# Patient Record
Sex: FEMALE | Employment: UNEMPLOYED | URBAN - METROPOLITAN AREA
[De-identification: names, ages, dates, MRNs, and addresses within clinical notes are randomized per-mention and may not be internally consistent; named-entity substitution may affect disease eponyms.]

---

## 2018-12-13 ENCOUNTER — OFFICE VISIT (OUTPATIENT)
Dept: PRIMARY CARE CLINIC | Age: 23
End: 2018-12-13

## 2018-12-13 VITALS
TEMPERATURE: 98.2 F | SYSTOLIC BLOOD PRESSURE: 105 MMHG | DIASTOLIC BLOOD PRESSURE: 73 MMHG | OXYGEN SATURATION: 100 % | WEIGHT: 111.8 LBS | HEART RATE: 97 BPM | RESPIRATION RATE: 16 BRPM | BODY MASS INDEX: 20.57 KG/M2 | HEIGHT: 62 IN

## 2018-12-13 DIAGNOSIS — R53.83 FATIGUE, UNSPECIFIED TYPE: Primary | ICD-10-CM

## 2018-12-13 DIAGNOSIS — R63.4 WEIGHT LOSS: ICD-10-CM

## 2018-12-13 DIAGNOSIS — R61 SWEATING PROFUSELY: ICD-10-CM

## 2018-12-13 PROBLEM — F90.9 ADHD: Status: ACTIVE | Noted: 2018-12-13

## 2018-12-13 RX ORDER — NORGESTIMATE AND ETHINYL ESTRADIOL 7DAYSX3 28
KIT ORAL
COMMUNITY
End: 2022-10-18

## 2018-12-13 RX ORDER — GABAPENTIN 100 MG/1
CAPSULE ORAL
Refills: 0 | COMMUNITY
Start: 2018-12-07 | End: 2020-03-17 | Stop reason: DRUGHIGH

## 2018-12-13 RX ORDER — DULOXETIN HYDROCHLORIDE 20 MG/1
CAPSULE, DELAYED RELEASE ORAL
Refills: 0 | COMMUNITY
Start: 2018-12-07 | End: 2020-03-17

## 2018-12-13 RX ORDER — DEXTROAMPHETAMINE SACCHARATE, AMPHETAMINE ASPARTATE, DEXTROAMPHETAMINE SULFATE AND AMPHETAMINE SULFATE 7.5; 7.5; 7.5; 7.5 MG/1; MG/1; MG/1; MG/1
TABLET ORAL
Refills: 0 | COMMUNITY
Start: 2018-12-06

## 2018-12-13 NOTE — PROGRESS NOTES
Chief Complaint   Patient presents with   STEPHANE [facility] Other     has had N/V/D, dizziness, hot flashes, night sweats, joint pain x4 weeks. Psyciatrist wants her to get tested for lyme disease, no tickbite that she knows of.     Neck Pain     back of neck, no injury     1. Have you been to the ER, urgent care clinic since your last visit? Hospitalized since your last visit? No    2. Have you seen or consulted any other health care providers outside of the [address] [place] [address] since your last visit? Include any pap smears or colon screening. No      Pt would like a pregnancy test but has not missed her period.

## 2018-12-13 NOTE — PROGRESS NOTES
Subjective:     Chief Complaint   Patient presents with   STEPHANE [facility] Other     has had N/V/D, dizziness, hot flashes, night sweats, joint pain x4 weeks. Psyciatrist wants her to get tested for lyme disease, no tickbite that she knows of.     Neck Pain     back of neck, no injury        She  is a 24y.o. year old female who presents as a new patient to [facility]. She recently graduated from [employer] and then moved to [place]. She is currently working as a EMT. . Her medical history is significant for ADHD, anxiety. Has started seeing psychiatrist in [facility]. Patient reports that when she answered some intake questionnaire at her psych office, her psychiatrist was concern about some of the symptoms she  put in the questionnaire. Reports that for the past 4-5 years she has been feeling very tired, dizzy, hot flashes, night sweats. Reports that at night she has been sweating profusely. She had seen her PCP, neurologist for the above symptoms but nothing significant was found. She had negative mono test, STI check up , normal thyroid and all other necessary blood work done and no abnormality was found. Patient states that she never tested for lyme D/S and would like to have this test done. Recently she  feeling more fatigue, weight loss about 15 pounds in last few months but slowly started gaining weight now. She did have some diarrhea two weeks ago but almost resolved now. States that her appetite is very good, no fever/chills, cough. Patient also reports that her body hurts, feels sore in her neck and back . She thinks its because she has been sleeping in sofa lately that's why. No headache, neck stiffness. A comprehensive review of systems was negative except for that written in the HPI.   Objective:     Vitals:    12/13/18 1207   BP: 105/73   Pulse: 97   Resp: 16   Temp: 98.2 °F (36.8 °C)   TempSrc: Oral   SpO2: 100%   Weight: 111 lb 12.8 oz (50.7 kg)   Height: 5' 1.75\" (1.568 m) Physical Examination: General appearance - alert, well appearing, and in no distress, oriented to person, place, and time and normal appearing weight  Mental status - alert, oriented to person, place, and time, normal mood, behavior, speech, dress, motor activity, and thought processes  Eyes - pupils equal and reactive, extraocular eye movements intact  Ears - bilateral TM's and external ear canals normal  Nose - normal and patent, no erythema, discharge or polyps  Mouth - mucous membranes moist, pharynx normal without lesions  Neck - supple, no significant adenopathy, thyroid exam: thyroid is normal in size without nodules or tenderness  Chest - clear to auscultation, no wheezes, rales or rhonchi, symmetric air entry  Heart - normal rate, regular rhythm, normal S1, S2, no murmurs, rubs, clicks or gallops  Abdomen - soft, nontender, nondistended, no masses or organomegaly  Back exam - full range of motion, no tenderness, palpable spasm or pain on motion  Neurological - alert, oriented, normal speech, no focal findings or movement disorder noted  Musculoskeletal - no joint tenderness, deformity or swelling  Extremities - no pedal edema noted    No Known Allergies   Social History     Socioeconomic History    Marital status: SINGLE     Spouse name: Not on file    Number of children: Not on file    Years of education: Not on file    Highest education level: Not on file   Tobacco Use    Smoking status: Never Smoker    Smokeless tobacco: Never Used   Substance and Sexual Activity    Alcohol use: Yes     Alcohol/week: 3.0 oz     Types: 5 Shots of liquor per week    Drug use: No      No family history on file. History reviewed. No pertinent surgical history.    Past Medical History:   Diagnosis Date    ADHD       Current Outpatient Medications   Medication Sig Dispense Refill    DULoxetine (CYMBALTA) 20 mg capsule take 2 capsules by mouth every morning  0    dextroamphetamine-amphetamine (ADDERALL) 30 mg tablet take 1 tablet by mouth twice a day  0    gabapentin (NEURONTIN) 100 mg capsule take 1 capsule by mouth twice a day  0    norgestimate-ethinyl estradiol (TRI-PREVIFEM, 28,) 0.18/0.215/0.25 mg-35 mcg (28) tab Take  by mouth. Assessment/ Plan:   Diagnoses and all orders for this visit:    1. Fatigue, unspecified type  -     CBC WITH AUTOMATED DIFF  -     TSH AND FREE T4  -     METABOLIC PANEL, COMPREHENSIVE  -     MONONUCLEOSIS SCREEN  -     LYME AB, IGG & IGM BY WB    2. Weight loss  -     CBC WITH AUTOMATED DIFF  -     TSH AND FREE T4  -     METABOLIC PANEL, COMPREHENSIVE  -     MONONUCLEOSIS SCREEN  -     LYME AB, IGG & IGM BY WB    3. Sweating profusely  -     CBC WITH AUTOMATED DIFF  -     TSH AND FREE T4  -     METABOLIC PANEL, COMPREHENSIVE  -     MONONUCLEOSIS SCREEN  -     LYME AB, IGG & IGM BY WB       Will notify patient with result. advised to follow up if symptoms does not better. Medication risks/benefits/costs/interactions/alternatives discussed with patient. Advised patient to call back or return to office if symptoms worsen/change/persist. If patient cannot reach us or should anything more severe/urgent arise he/she should proceed directly to the nearest emergency department. Discussed expected course/resolution/complications of diagnosis in detail with patient. Patient given a written after visit summary which includes her diagnoses, current medications and vitals. Patient expressed understanding with the diagnosis and plan. Follow-up Disposition:  Return if symptoms worsen or fail to improve.

## 2018-12-14 LAB
18 KD (IGG) BAND: ABNORMAL
23 KD (IGG) BAND: ABNORMAL
23 KD (IGM) BAND: ABNORMAL
28 KD (IGG) BAND: ABNORMAL
30 KD (IGG) BAND: ABNORMAL
39 KD (IGG) BAND: ABNORMAL
39 KD (IGM) BAND: ABNORMAL
41 KD (IGG) BAND: ABNORMAL
41 KD (IGM) BAND: ABNORMAL
45 KD (IGG) BAND: ABNORMAL
58 KD (IGG) BAND: ABNORMAL
66 KD (IGG) BAND: REACTIVE
93 KD (IGG) BAND: ABNORMAL
ABSOLUTE BANDS, 67058: ABNORMAL
ABSOLUTE BLASTS: ABNORMAL
ABSOLUTE METAMYELOCYTES, 900360: ABNORMAL
ABSOLUTE MYELOCYTES: ABNORMAL
ABSOLUTE NRBC,ANRBC: ABNORMAL
ABSOLUTE PROMYELOCYTES: ABNORMAL
ALB/GLOBRATIO, 58C: 1.6 (CALC) (ref 1–2.5)
ALBUMIN SERPL-MCNC: 4.2 G/DL (ref 3.6–5.1)
ALP SERPL-CCNC: 45 U/L (ref 33–115)
ALT SERPL-CCNC: 11 U/L (ref 6–29)
AST SERPL W P-5'-P-CCNC: 14 U/L (ref 10–30)
BANDS,BANDS: ABNORMAL
BASOPHILS # BLD: 42 CELLS/UL (ref 0–200)
BASOPHILS NFR BLD: 0.8 %
BILIRUB SERPL-MCNC: 0.2 MG/DL (ref 0.2–1.2)
BLASTS,BLAST: ABNORMAL
BUN SERPL-MCNC: 11 MG/DL (ref 7–25)
BUN/CREATININE RATIO,BUCR: NORMAL (CALC) (ref 6–22)
CALCIUM SERPL-MCNC: 9.1 MG/DL (ref 8.6–10.2)
CHLORIDE SERPL-SCNC: 103 MMOL/L (ref 98–110)
CO2 SERPL-SCNC: 27 MMOL/L (ref 20–32)
COMMENT(S): ABNORMAL
CREAT SERPL-MCNC: 0.83 MG/DL (ref 0.5–1.1)
EOSINOPHIL # BLD: 138 CELLS/UL (ref 15–500)
EOSINOPHIL NFR BLD: 2.6 %
ERYTHROCYTE [DISTWIDTH] IN BLOOD BY AUTOMATED COUNT: 12 % (ref 11–15)
GLOBULIN,GLOB: 2.7 G/DL (CALC) (ref 1.9–3.7)
GLUCOSE SERPL-MCNC: 80 MG/DL (ref 65–99)
HCT VFR BLD AUTO: 38.7 % (ref 35–45)
HETEROPHILE, MONO SCREEN: NEGATIVE
HGB BLD-MCNC: 13 G/DL (ref 11.7–15.5)
LYMPHOCYTES # BLD: 2396 CELLS/UL (ref 850–3900)
LYMPHOCYTES NFR BLD: 45.2 %
Lab: NEGATIVE
Lab: NEGATIVE
MCH RBC QN AUTO: 31.3 PG (ref 27–33)
MCHC RBC AUTO-ENTMCNC: 33.6 G/DL (ref 32–36)
MCV RBC AUTO: 93 FL (ref 80–100)
METAMYELOCYTES,METAS: ABNORMAL
MONOCYTES # BLD: 737 CELLS/UL (ref 200–950)
MONOCYTES NFR BLD: 13.9 %
MYELOCYTES,MYELO: ABNORMAL
NEUTROPHILS # BLD AUTO: 1988 CELLS/UL (ref 1500–7800)
NEUTROPHILS # BLD: 37.5 %
NRBC: ABNORMAL
PLATELET # BLD AUTO: 403 THOUSAND/UL (ref 140–400)
PMV BLD AUTO: 10.1 FL (ref 7.5–12.5)
POTASSIUM SERPL-SCNC: 4.1 MMOL/L (ref 3.5–5.3)
PROMYELOCYTES,PRO: ABNORMAL
PROT SERPL-MCNC: 6.9 G/DL (ref 6.1–8.1)
RBC # BLD AUTO: 4.16 MILLION/UL (ref 3.8–5.1)
REACTIVE LYMPHS: ABNORMAL
SODIUM SERPL-SCNC: 137 MMOL/L (ref 135–146)
T4 FREE SERPL-MCNC: 1 NG/DL (ref 0.8–1.8)
TSH SERPL DL<=0.005 MIU/L-ACNC: 1.58 MIU/L
WBC # BLD AUTO: 5.3 THOUSAND/UL (ref 3.8–10.8)

## 2018-12-17 ENCOUNTER — TELEPHONE (OUTPATIENT)
Dept: PRIMARY CARE CLINIC | Age: 23
End: 2018-12-17

## 2018-12-17 NOTE — TELEPHONE ENCOUNTER
I called back. She was letting me know that she sometimes have numbness tingling in her back . She was asking if she has MS. Advised that its a huge diagnosis and I cannot diagnose her over phone. She said she is busy now . She thanked me and hung up the phone.

## 2019-03-21 ENCOUNTER — HOSPITAL ENCOUNTER (OUTPATIENT)
Dept: ULTRASOUND IMAGING | Age: 24
Discharge: HOME | End: 2019-03-21
Attending: INTERNAL MEDICINE
Payer: COMMERCIAL

## 2019-03-21 DIAGNOSIS — R11.2 NAUSEA AND VOMITING: ICD-10-CM

## 2019-03-21 DIAGNOSIS — K59.00 CONSTIPATION: ICD-10-CM

## 2019-03-21 DIAGNOSIS — K82.4 GALLBLADDER POLYP: ICD-10-CM

## 2019-03-21 PROCEDURE — 76700 US EXAM ABDOM COMPLETE: CPT

## 2020-02-26 ENCOUNTER — OFFICE VISIT (OUTPATIENT)
Dept: SURGERY | Age: 25
End: 2020-02-26

## 2020-02-26 VITALS
TEMPERATURE: 98.1 F | HEIGHT: 63 IN | DIASTOLIC BLOOD PRESSURE: 64 MMHG | HEART RATE: 99 BPM | WEIGHT: 120 LBS | BODY MASS INDEX: 21.26 KG/M2 | RESPIRATION RATE: 19 BRPM | OXYGEN SATURATION: 99 % | SYSTOLIC BLOOD PRESSURE: 105 MMHG

## 2020-02-26 DIAGNOSIS — K82.4 GALLBLADDER POLYP: Primary | ICD-10-CM

## 2020-02-26 PROBLEM — F43.10 PTSD (POST-TRAUMATIC STRESS DISORDER): Status: ACTIVE | Noted: 2020-02-26

## 2020-02-26 RX ORDER — TRAZODONE HYDROCHLORIDE 50 MG/1
TABLET ORAL
COMMUNITY
Start: 2020-02-03

## 2020-02-26 RX ORDER — DESVENLAFAXINE SUCCINATE 50 MG/1
100 TABLET, EXTENDED RELEASE ORAL
COMMUNITY
End: 2020-03-17 | Stop reason: DRUGHIGH

## 2020-02-26 NOTE — PROGRESS NOTES
1. Have you been to the ER, urgent care clinic since your last visit? Hospitalized since your last visit? No    2. Have you seen or consulted any other health care providers outside of the [address] [place] [address] since your last visit? Include any pap smears or colon screening.  No

## 2020-02-26 NOTE — H&P (VIEW-ONLY)
Surgery History and Physical 
 
Subjective: Marisol Sharp is a 21 y. o.  female who presents for evaluation of gallbladder polyps. A couple of years ago, Ms. Madelaine Mcnally was diagnosed with gallstones during a w/u for abdominal pain and diarrhea. Last year, she was seen by GI and had a repeat US which revealed the largest polyp of 0.94 cm, which had increased. She still has postprandial n/v with greasy foods on occasion. She denies any h/o PUD, pancreatitis, liver disease, IBD, or IBS. She has not had any abdominal procedures. She was referred to have a surgical consultation last year, but did not receive it. Past Medical History:  
Diagnosis Date  ADHD  PTSD (post-traumatic stress disorder) History reviewed. No pertinent surgical history. Family History Problem Relation Age of Onset  Cancer Father   
     thyroid  Diabetes Maternal Grandmother  Hypertension Maternal Grandmother  Diabetes Maternal Grandfather  Hypertension Maternal Grandfather Social History Tobacco Use  Smoking status: Never Smoker  Smokeless tobacco: Never Used Substance Use Topics  Alcohol use: Yes Alcohol/week: 5.0 standard drinks Types: 5 Shots of liquor per week Prior to Admission medications Medication Sig Start Date End Date Taking? Authorizing Provider  
desvenlafaxine succinate (PRISTIQ) 50 mg ER tablet Take 50 mg by mouth. Yes Provider, Historical  
traZODone (DESYREL) 50 mg tablet TAKE 1 TABLET BY MOUTH AT BEDTIME 2/3/20  Yes Provider, Historical  
dextroamphetamine-amphetamine (ADDERALL) 30 mg tablet take 1 tablet by mouth twice a day 12/6/18  Yes Provider, Historical  
gabapentin (NEURONTIN) 100 mg capsule take 1 capsule by mouth twice a day 12/7/18  Yes Provider, Historical  
norgestimate-ethinyl estradiol (TRI-PREVIFEM, 28,) 0.18/0.215/0.25 mg-35 mcg (28) tab Take  by mouth.    Yes Provider, Historical  
 DULoxetine (CYMBALTA) 20 mg capsule take 2 capsules by mouth every morning 12/7/18   Provider, Historical  
  
No Known Allergies Review of Systems: A comprehensive review of systems was negative except for that written in the History of Present Illness. Objective:  
 
 Physical Exam: 
GENERAL: alert, cooperative, no distress, appears stated age, EYE: negative findings: anicteric sclera, LYMPHATIC: Cervical, supraclavicular, and axillary nodes normal. , THROAT & NECK: normal, LUNG: clear to auscultation bilaterally, HEART: regular rate and rhythm, ABDOMEN: Soft, NT, ND. There are no masses. , EXTREMITIES:  no edema, SKIN: Normal., NEUROLOGIC: negative, PSYCHIATRIC: non focal 
 
Assessment:  
 
Gallbladder polyps. Plan: Ms. Vini Fitzgerald is interested in having her GB removed to eliminate the future risk of cancer and has requested 3/19 after her job interview in [place]. I discussed the risks of the procedure including bleeding, infection, wound healing problems, blood clots, cancer, injury to the bowel, liver, or bile ducts, and reaction to the prep or local and general anesthetic. She understands the risks; any and all questions were answered to her satisfaction. Ms. Vini Fitzgerald will be scheduled for an elective outpatient robotic-assisted laparoscopic cholecystectomy with firefly, possible open under general anesthesia.

## 2020-02-27 ENCOUNTER — TELEPHONE (OUTPATIENT)
Dept: SURGERY | Age: 25
End: 2020-02-27

## 2020-02-27 NOTE — TELEPHONE ENCOUNTER
Attempted to call patient with surgery information, voicemail states \" voicemail is full\". Will attempt at later time.

## 2020-03-17 RX ORDER — DESVENLAFAXINE 100 MG/1
100 TABLET, EXTENDED RELEASE ORAL ONCE
COMMUNITY

## 2020-03-17 RX ORDER — GABAPENTIN 800 MG/1
800 TABLET ORAL 2 TIMES DAILY
COMMUNITY

## 2020-03-17 NOTE — PERIOP NOTES
[address] [address] [facility]   MAIN OR                                  [phone]   MAIN PRE OP                          [phone]                                                                                AMBULATORY PRE OP          [phone]  PRE-ADMISSION TESTING    [phone]   Surgery Date:   3/19/2020         Is surgery arrival time given by surgeon? NO  If NO, [facility] staff will call you between 3 and 7pm the day before your surgery with your arrival time. (If your surgery is on a Monday, we will call you the Friday before.)    Call [phone] after 7pm Monday-Friday if you did not receive this call. INSTRUCTIONS BEFORE YOUR SURGERY   When You  Arrive Arrive at the 2nd [address] [facility] on the day of your surgery  Have your insurance card, photo ID, and any copayment (if needed)   Food   and   Drink NO food or drink after midnight the night before surgery    This means NO water, gum, mints, coffee, juice, etc.  No alcohol (beer, wine, liquor) 24 hours before and after surgery   Medications to   TAKE   Morning of Surgery MEDICATIONS TO TAKE THE MORNING OF SURGERY WITH A SIP OF WATER:    gapapentin    Pristig   Tri-previfem   Medications  To  STOP      7 days before surgery  Non-Steroidal anti-inflammatory Drugs (NSAID's): for example, Ibuprofen (Advil, Motrin), Naproxen (Aleve)   Aspirin, if taking for pain    Herbal supplements, vitamins, and fish oil   Other:  (Pain medications not listed above, including Tylenol may be taken)   Blood  Thinners  If you take  Aspirin, Plavix, Coumadin, or any blood-thinning or anti-blood clot medicine, talk to the doctor who prescribed the medications for pre-operative instructions.    Bathing Clothing  Jewelry  Valuables      If you shower the morning of surgery, please do not apply anything to your skin (lotions, powders, deodorant, or makeup, especially lawson)   Follow Chlorhexidine Care Fusion body wash instructions provided to you during PAT appointment. Begin 3 days prior to surgery.  Do not shave or trim anywhere 24 hours before surgery   Wear your hair loose or down; no pony-tails, buns, or metal hair clips   Wear loose, comfortable, clean clothes   Wear glasses instead of contacts   Leave money, valuables, and jewelry, including body piercings, at home   Going Home - or Spending the Night  SAME-DAY SURGERY: You must have a responsible adult drive you home and stay with you 24 hours after surgery   ADMITS: If your doctor is keeping you in the hospital after surgery, leave personal belongings/luggage in your car until you have a hospital room number. Hospital discharge time is 12 noon  Drivers must be here before 12 noon unless you are told differently   Special Instructions DO NOT take Adderall the day of surgery     Follow all instructions so your surgery wont be cancelled. Please, be on time. If a situation occurs and you are delayed the day of surgery, call [phone] or [phone]. If your physical condition changes (like a fever, cold, flu, etc.) call your surgeon. Home medication(s) reviewed and verified phone during PAT appointment. The patient was contacted  via phone. The patient verbalizes understanding of all instructions and does not  need reinforcement.

## 2020-03-18 ENCOUNTER — ANESTHESIA EVENT (OUTPATIENT)
Dept: SURGERY | Age: 25
End: 2020-03-18
Payer: COMMERCIAL

## 2020-03-19 ENCOUNTER — ANESTHESIA (OUTPATIENT)
Dept: SURGERY | Age: 25
End: 2020-03-19
Payer: COMMERCIAL

## 2020-03-19 ENCOUNTER — HOSPITAL ENCOUNTER (OUTPATIENT)
Age: 25
Setting detail: OUTPATIENT SURGERY
Discharge: HOME | End: 2020-03-19
Attending: SURGERY | Admitting: SURGERY
Payer: COMMERCIAL

## 2020-03-19 VITALS
HEART RATE: 92 BPM | TEMPERATURE: 97.7 F | BODY MASS INDEX: 22.45 KG/M2 | DIASTOLIC BLOOD PRESSURE: 58 MMHG | HEIGHT: 62 IN | SYSTOLIC BLOOD PRESSURE: 95 MMHG | WEIGHT: 122 LBS | RESPIRATION RATE: 21 BRPM | OXYGEN SATURATION: 98 %

## 2020-03-19 DIAGNOSIS — Z90.49 S/P LAPAROSCOPIC CHOLECYSTECTOMY: Primary | ICD-10-CM

## 2020-03-19 DIAGNOSIS — K82.4 GALLBLADDER POLYP: ICD-10-CM

## 2020-03-19 LAB — HCG UR QL: NEGATIVE

## 2020-03-19 PROCEDURE — 77030008771 HC TU NG SALEM SUMP -A: Performed by: NURSE ANESTHETIST, CERTIFIED REGISTERED

## 2020-03-19 PROCEDURE — 74011250636 HC RX REV CODE- 250/636: Performed by: ANESTHESIOLOGY

## 2020-03-19 PROCEDURE — 74011250636 HC RX REV CODE- 250/636: Performed by: NURSE ANESTHETIST, CERTIFIED REGISTERED

## 2020-03-19 PROCEDURE — 77030010939 HC CLP LIG TELE -B: Performed by: SURGERY

## 2020-03-19 PROCEDURE — 77030008608 HC TRCR ENDOSC SMTH AMR -B: Performed by: SURGERY

## 2020-03-19 PROCEDURE — 77030016151 HC PROTCTR LNS DFOG COVD -B: Performed by: SURGERY

## 2020-03-19 PROCEDURE — 88304 TISSUE EXAM BY PATHOLOGIST: CPT

## 2020-03-19 PROCEDURE — 77030018836 HC SOL IRR NACL ICUM -A: Performed by: SURGERY

## 2020-03-19 PROCEDURE — 77030008756 HC TU IRR SUC STRY -B: Performed by: SURGERY

## 2020-03-19 PROCEDURE — 77030033188 HC TBNG FLTRD BIIFUR DISP CNMD -C: Performed by: SURGERY

## 2020-03-19 PROCEDURE — 76010000934 HC OR TIME 1 TO 1.5HR INTENSV - TIER 2: Performed by: SURGERY

## 2020-03-19 PROCEDURE — 77030020263 HC SOL INJ SOD CL0.9% LFCR 1000ML: Performed by: SURGERY

## 2020-03-19 PROCEDURE — 77030035277 HC OBTRTR BLDELSS DISP INTU -B: Performed by: SURGERY

## 2020-03-19 PROCEDURE — 81025 URINE PREGNANCY TEST: CPT

## 2020-03-19 PROCEDURE — 77030012770 HC TRCR OPT FX AMR -B: Performed by: SURGERY

## 2020-03-19 PROCEDURE — 74011000254 HC RX REV CODE- 254: Performed by: SURGERY

## 2020-03-19 PROCEDURE — 77030019908 HC STETH ESOPH SIMS -A: Performed by: NURSE ANESTHETIST, CERTIFIED REGISTERED

## 2020-03-19 PROCEDURE — 76060000033 HC ANESTHESIA 1 TO 1.5 HR: Performed by: SURGERY

## 2020-03-19 PROCEDURE — 77030007955 HC PCH ENDOSC SPEC J&J -B: Performed by: SURGERY

## 2020-03-19 PROCEDURE — 76210000017 HC OR PH I REC 1.5 TO 2 HR: Performed by: SURGERY

## 2020-03-19 PROCEDURE — 76210000021 HC REC RM PH II 0.5 TO 1 HR: Performed by: SURGERY

## 2020-03-19 PROCEDURE — 77030020703 HC SEAL CANN DISP INTU -B: Performed by: SURGERY

## 2020-03-19 PROCEDURE — 74011000250 HC RX REV CODE- 250: Performed by: SURGERY

## 2020-03-19 PROCEDURE — 77030018673: Performed by: SURGERY

## 2020-03-19 PROCEDURE — 77030011640 HC PAD GRND REM COVD -A: Performed by: SURGERY

## 2020-03-19 PROCEDURE — 77030039429 HC SUT PASSR CROSSBOW DISP ADLR -B: Performed by: SURGERY

## 2020-03-19 PROCEDURE — 77030008684 HC TU ET CUF COVD -B: Performed by: NURSE ANESTHETIST, CERTIFIED REGISTERED

## 2020-03-19 PROCEDURE — 77030002933 HC SUT MCRYL J&J -A: Performed by: SURGERY

## 2020-03-19 PROCEDURE — 74011000250 HC RX REV CODE- 250: Performed by: NURSE ANESTHETIST, CERTIFIED REGISTERED

## 2020-03-19 PROCEDURE — 77030031139 HC SUT VCRL2 J&J -A: Performed by: SURGERY

## 2020-03-19 PROCEDURE — 74011250636 HC RX REV CODE- 250/636: Performed by: SURGERY

## 2020-03-19 PROCEDURE — 77030026438 HC STYL ET INTUB CARD -A: Performed by: NURSE ANESTHETIST, CERTIFIED REGISTERED

## 2020-03-19 RX ORDER — DIPHENHYDRAMINE HYDROCHLORIDE 50 MG/ML
12.5 INJECTION, SOLUTION INTRAMUSCULAR; INTRAVENOUS AS NEEDED
Status: DISCONTINUED | OUTPATIENT
Start: 2020-03-19 | End: 2020-03-19 | Stop reason: HOSPADM

## 2020-03-19 RX ORDER — OXYCODONE AND ACETAMINOPHEN 5; 325 MG/1; MG/1
1 TABLET ORAL
Qty: 12 TAB | Refills: 0 | Status: SHIPPED | OUTPATIENT
Start: 2020-03-19 | End: 2020-03-22

## 2020-03-19 RX ORDER — FENTANYL CITRATE 50 UG/ML
25 INJECTION, SOLUTION INTRAMUSCULAR; INTRAVENOUS
Status: DISCONTINUED | OUTPATIENT
Start: 2020-03-19 | End: 2020-03-19 | Stop reason: HOSPADM

## 2020-03-19 RX ORDER — BUPIVACAINE HYDROCHLORIDE 5 MG/ML
30 INJECTION, SOLUTION EPIDURAL; INTRACAUDAL ONCE
Status: COMPLETED | OUTPATIENT
Start: 2020-03-19 | End: 2020-03-19

## 2020-03-19 RX ORDER — SODIUM CHLORIDE 0.9 % (FLUSH) 0.9 %
5-40 SYRINGE (ML) INJECTION EVERY 8 HOURS
Status: DISCONTINUED | OUTPATIENT
Start: 2020-03-19 | End: 2020-03-19 | Stop reason: HOSPADM

## 2020-03-19 RX ORDER — HYDROMORPHONE HYDROCHLORIDE 1 MG/ML
.25-1 INJECTION, SOLUTION INTRAMUSCULAR; INTRAVENOUS; SUBCUTANEOUS
Status: DISCONTINUED | OUTPATIENT
Start: 2020-03-19 | End: 2020-03-19 | Stop reason: HOSPADM

## 2020-03-19 RX ORDER — NALOXONE HYDROCHLORIDE 0.4 MG/ML
0.04 INJECTION, SOLUTION INTRAMUSCULAR; INTRAVENOUS; SUBCUTANEOUS
Status: DISCONTINUED | OUTPATIENT
Start: 2020-03-19 | End: 2020-03-19 | Stop reason: HOSPADM

## 2020-03-19 RX ORDER — ONDANSETRON 2 MG/ML
INJECTION INTRAMUSCULAR; INTRAVENOUS AS NEEDED
Status: DISCONTINUED | OUTPATIENT
Start: 2020-03-19 | End: 2020-03-19 | Stop reason: HOSPADM

## 2020-03-19 RX ORDER — PROPOFOL 10 MG/ML
INJECTION, EMULSION INTRAVENOUS AS NEEDED
Status: DISCONTINUED | OUTPATIENT
Start: 2020-03-19 | End: 2020-03-19 | Stop reason: HOSPADM

## 2020-03-19 RX ORDER — SODIUM CHLORIDE, SODIUM LACTATE, POTASSIUM CHLORIDE, CALCIUM CHLORIDE 600; 310; 30; 20 MG/100ML; MG/100ML; MG/100ML; MG/100ML
125 INJECTION, SOLUTION INTRAVENOUS CONTINUOUS
Status: DISCONTINUED | OUTPATIENT
Start: 2020-03-19 | End: 2020-03-19 | Stop reason: HOSPADM

## 2020-03-19 RX ORDER — KETOROLAC TROMETHAMINE 30 MG/ML
INJECTION, SOLUTION INTRAMUSCULAR; INTRAVENOUS AS NEEDED
Status: DISCONTINUED | OUTPATIENT
Start: 2020-03-19 | End: 2020-03-19 | Stop reason: HOSPADM

## 2020-03-19 RX ORDER — SODIUM CHLORIDE 0.9 % (FLUSH) 0.9 %
5-40 SYRINGE (ML) INJECTION AS NEEDED
Status: DISCONTINUED | OUTPATIENT
Start: 2020-03-19 | End: 2020-03-19 | Stop reason: HOSPADM

## 2020-03-19 RX ORDER — LIDOCAINE HYDROCHLORIDE 20 MG/ML
INJECTION, SOLUTION EPIDURAL; INFILTRATION; INTRACAUDAL; PERINEURAL AS NEEDED
Status: DISCONTINUED | OUTPATIENT
Start: 2020-03-19 | End: 2020-03-19 | Stop reason: HOSPADM

## 2020-03-19 RX ORDER — ADAPALENE AND BENZOYL PEROXIDE .1; 2.5 G/100G; G/100G
GEL TOPICAL
COMMUNITY
End: 2022-10-18

## 2020-03-19 RX ORDER — GLYCOPYRROLATE 0.2 MG/ML
INJECTION INTRAMUSCULAR; INTRAVENOUS AS NEEDED
Status: DISCONTINUED | OUTPATIENT
Start: 2020-03-19 | End: 2020-03-19 | Stop reason: HOSPADM

## 2020-03-19 RX ORDER — LIDOCAINE HYDROCHLORIDE 10 MG/ML
0.1 INJECTION, SOLUTION EPIDURAL; INFILTRATION; INTRACAUDAL; PERINEURAL AS NEEDED
Status: DISCONTINUED | OUTPATIENT
Start: 2020-03-19 | End: 2020-03-19 | Stop reason: HOSPADM

## 2020-03-19 RX ORDER — DEXAMETHASONE SODIUM PHOSPHATE 4 MG/ML
INJECTION, SOLUTION INTRA-ARTICULAR; INTRALESIONAL; INTRAMUSCULAR; INTRAVENOUS; SOFT TISSUE AS NEEDED
Status: DISCONTINUED | OUTPATIENT
Start: 2020-03-19 | End: 2020-03-19 | Stop reason: HOSPADM

## 2020-03-19 RX ORDER — NEOSTIGMINE METHYLSULFATE 1 MG/ML
INJECTION, SOLUTION INTRAVENOUS AS NEEDED
Status: DISCONTINUED | OUTPATIENT
Start: 2020-03-19 | End: 2020-03-19 | Stop reason: HOSPADM

## 2020-03-19 RX ORDER — ONDANSETRON 2 MG/ML
4 INJECTION INTRAMUSCULAR; INTRAVENOUS AS NEEDED
Status: DISCONTINUED | OUTPATIENT
Start: 2020-03-19 | End: 2020-03-19 | Stop reason: HOSPADM

## 2020-03-19 RX ORDER — FENTANYL CITRATE 50 UG/ML
INJECTION, SOLUTION INTRAMUSCULAR; INTRAVENOUS AS NEEDED
Status: DISCONTINUED | OUTPATIENT
Start: 2020-03-19 | End: 2020-03-19 | Stop reason: HOSPADM

## 2020-03-19 RX ORDER — FLUMAZENIL 0.1 MG/ML
0.2 INJECTION INTRAVENOUS
Status: DISCONTINUED | OUTPATIENT
Start: 2020-03-19 | End: 2020-03-19 | Stop reason: HOSPADM

## 2020-03-19 RX ORDER — WATER FOR INJECTION,STERILE
VIAL (ML) INJECTION
Status: DISCONTINUED
Start: 2020-03-19 | End: 2020-03-19 | Stop reason: HOSPADM

## 2020-03-19 RX ORDER — ROCURONIUM BROMIDE 10 MG/ML
INJECTION, SOLUTION INTRAVENOUS AS NEEDED
Status: DISCONTINUED | OUTPATIENT
Start: 2020-03-19 | End: 2020-03-19 | Stop reason: HOSPADM

## 2020-03-19 RX ORDER — MIDAZOLAM HYDROCHLORIDE 1 MG/ML
INJECTION, SOLUTION INTRAMUSCULAR; INTRAVENOUS AS NEEDED
Status: DISCONTINUED | OUTPATIENT
Start: 2020-03-19 | End: 2020-03-19 | Stop reason: HOSPADM

## 2020-03-19 RX ORDER — INDOCYANINE GREEN AND WATER 25 MG
5 KIT INJECTION ONCE
Status: COMPLETED | OUTPATIENT
Start: 2020-03-19 | End: 2020-03-19

## 2020-03-19 RX ADMIN — FENTANYL CITRATE 50 MCG: 50 INJECTION, SOLUTION INTRAMUSCULAR; INTRAVENOUS at 12:01

## 2020-03-19 RX ADMIN — HYDROMORPHONE HYDROCHLORIDE 0.5 MG: 1 INJECTION, SOLUTION INTRAMUSCULAR; INTRAVENOUS; SUBCUTANEOUS at 14:45

## 2020-03-19 RX ADMIN — INDOCYANINE GREEN AND WATER 5 MG: KIT at 12:00

## 2020-03-19 RX ADMIN — Medication 3 MG: at 13:00

## 2020-03-19 RX ADMIN — HYDROMORPHONE HYDROCHLORIDE 0.5 MG: 1 INJECTION, SOLUTION INTRAMUSCULAR; INTRAVENOUS; SUBCUTANEOUS at 13:50

## 2020-03-19 RX ADMIN — FENTANYL CITRATE 50 MCG: 50 INJECTION, SOLUTION INTRAMUSCULAR; INTRAVENOUS at 12:39

## 2020-03-19 RX ADMIN — FENTANYL CITRATE 50 MCG: 50 INJECTION, SOLUTION INTRAMUSCULAR; INTRAVENOUS at 11:59

## 2020-03-19 RX ADMIN — HYDROMORPHONE HYDROCHLORIDE 0.5 MG: 1 INJECTION, SOLUTION INTRAMUSCULAR; INTRAVENOUS; SUBCUTANEOUS at 14:25

## 2020-03-19 RX ADMIN — HYDROMORPHONE HYDROCHLORIDE 0.5 MG: 1 INJECTION, SOLUTION INTRAMUSCULAR; INTRAVENOUS; SUBCUTANEOUS at 13:59

## 2020-03-19 RX ADMIN — DEXAMETHASONE SODIUM PHOSPHATE 8 MG: 4 INJECTION, SOLUTION INTRAMUSCULAR; INTRAVENOUS at 12:11

## 2020-03-19 RX ADMIN — ONDANSETRON 4 MG: 2 INJECTION INTRAMUSCULAR; INTRAVENOUS at 12:11

## 2020-03-19 RX ADMIN — SODIUM CHLORIDE, SODIUM LACTATE, POTASSIUM CHLORIDE, AND CALCIUM CHLORIDE 125 ML/HR: 600; 310; 30; 20 INJECTION, SOLUTION INTRAVENOUS at 11:51

## 2020-03-19 RX ADMIN — HYDROMORPHONE HYDROCHLORIDE 0.5 MG: 1 INJECTION, SOLUTION INTRAMUSCULAR; INTRAVENOUS; SUBCUTANEOUS at 15:02

## 2020-03-19 RX ADMIN — PROMETHAZINE HYDROCHLORIDE 6.25 MG: 25 INJECTION INTRAMUSCULAR; INTRAVENOUS at 13:49

## 2020-03-19 RX ADMIN — WATER 2 G: 1 INJECTION INTRAMUSCULAR; INTRAVENOUS; SUBCUTANEOUS at 12:09

## 2020-03-19 RX ADMIN — HYDROMORPHONE HYDROCHLORIDE 0.5 MG: 1 INJECTION, SOLUTION INTRAMUSCULAR; INTRAVENOUS; SUBCUTANEOUS at 13:42

## 2020-03-19 RX ADMIN — MIDAZOLAM HYDROCHLORIDE 3 MG: 2 INJECTION, SOLUTION INTRAMUSCULAR; INTRAVENOUS at 11:57

## 2020-03-19 RX ADMIN — ROCURONIUM BROMIDE 30 MG: 50 INJECTION, SOLUTION INTRAVENOUS at 12:04

## 2020-03-19 RX ADMIN — GLYCOPYRROLATE 0.4 MG: 0.2 INJECTION, SOLUTION INTRAMUSCULAR; INTRAVENOUS at 13:00

## 2020-03-19 RX ADMIN — LIDOCAINE HYDROCHLORIDE 40 MG: 20 INJECTION, SOLUTION INTRAVENOUS at 12:04

## 2020-03-19 RX ADMIN — PROPOFOL 150 MG: 10 INJECTION, EMULSION INTRAVENOUS at 12:04

## 2020-03-19 RX ADMIN — SODIUM CHLORIDE, SODIUM LACTATE, POTASSIUM CHLORIDE, AND CALCIUM CHLORIDE: 600; 310; 30; 20 INJECTION, SOLUTION INTRAVENOUS at 12:53

## 2020-03-19 RX ADMIN — KETOROLAC TROMETHAMINE 30 MG: 30 INJECTION INTRAMUSCULAR; INTRAVENOUS at 12:56

## 2020-03-19 NOTE — PROGRESS NOTES
[place] [address] [facility]. [address] [facility], [address]   [phone]    Return to School/Work Note    March 19, 2020   To Whom it May Concern:    Jonathon Pereira has had an outpatient procedure at this facility on March 19, 2020. Jonathon Pereira may return to work when cleared by her surgeon. Thank you for your consideration. _________________________________________  Radha Ruiz RN / Andrew Paget MD      [address] [place] [address] [address] [facility].  [place], [address] [facility]  Providing Good Help to those in need since 1824

## 2020-03-19 NOTE — PROGRESS NOTES
POST ANESTHESIA CARE    DISCHARGE / TRANSFER NOTE    Elijah Wilson was   discharged     via    wheel chair     to  private vehicle    . Patient was escorted by   nurse     . Patient verbalized   appreciation and was very pleased with care received    throughout their stay. Patient was discharged in     pleasant mood   . Pain at discharge/transfer was       5   /10. Discharge, medication and follow-up instructions were verbalized as understood prior to discharge  (if applicable for same-day procedures being discharged.)    All personal belongings have been returned to patient, and patient/family verbally confirm receiving belongings as all present.     Signed: John HAZELN RN-BC

## 2020-03-19 NOTE — INTERVAL H&P NOTE
H&P Update: Andrew Christine was seen and examined. History and physical has been reviewed. The patient has been examined.  There have been no significant clinical changes since the completion of the originally dated History and Physical.

## 2020-03-19 NOTE — ANESTHESIA POSTPROCEDURE EVALUATION
Procedure(s):  ROBOTIC ASSISTED LAPAROSCOPIC CHOLECYSTECTOMY WITH FIREFLY. general    Anesthesia Post Evaluation      Multimodal analgesia: multimodal analgesia not used between 6 hours prior to anesthesia start to PACU discharge  Patient location during evaluation: PACU  Patient participation: complete - patient participated  Level of consciousness: awake  Pain management: adequate  Airway patency: patent  Anesthetic complications: no  Cardiovascular status: acceptable, blood pressure returned to baseline and hemodynamically stable  Respiratory status: acceptable  Hydration status: acceptable  Post anesthesia nausea and vomiting:  controlled      Vitals Value Taken Time   /59 3/19/2020  1:55 PM   Temp 36.5 °C (97.7 °F) 3/19/2020  1:19 PM   Pulse 90 3/19/2020  1:59 PM   Resp 10 3/19/2020  1:59 PM   SpO2 97 % 3/19/2020  1:59 PM   Vitals shown include unvalidated device data.

## 2020-03-19 NOTE — PROGRESS NOTES
INCENTIVE SPIROMETER    Your nurse may send an incentive spirometer home with you to help with your breathing. The incentive spirometer provides another way to make the lungs work better. DIRECTIONS:  · Exhale - begin with empty lungs. · Place lips around the mouthpiece; take a SLOW and DEEP breath in.  · Keep the small blue \"float\" on the RIGHT between the top and bottom arrows. If you suck the small blue float all the way to the top, YOU ARE CHEATING. SLOW DOWN when you breathe in.  · Your nurse will help you decide your target level for the big blue \"float\" that rises. Usually, that number is over the 1500 level. Your goal number is based upon your height and age, giving an estimate goal for your lung volume. · The arrow on the left side marks your goal.  Always try to go past your goal. Raise the arrow when you make a new goal.    · When you take a full deep breath in, hold it for 2 seconds. Exhale normally. Don't be afraid to push yourself; rest a little between each attempt. Use the Incentive spirometer 10 times every hour while awake. It is OK to break the 10 to 12 attempts into smaller numbers if this exercise makes your tired. For example, perform 2 times every 10 minutes.

## 2020-03-19 NOTE — OP NOTES
Operative Report    Nicolas Mejia    MRN:  [id number]. Date of Surgery:   3/19/2020     Surgeon:  Katia To MD.      Assistant:    Wayne [place]. 2. Laurie Magaña. Anesthesia:   1. General endotracheal.  2.  0.5% Marcaine. Preoperative Diagnosis:   GALLBLADDER POLYPS. Postoperative Diagnosis:   GALLBLADDER POLYPS. Procedure:   Procedure(s):  ROBOTIC ASSISTED LAPAROSCOPIC CHOLECYSTECTOMY WITH FIREFLY. Indication:   Nicolas Mejia is a 21 yrs   female with a history of enlarging gallbladder polyps  Identified on ultrasound. Procedure in Detail:  The patient was seen preoperatively in the holding area. The risks, benefits, and expected outcomes were discussed with the patient, and all questions were answered satisfactorily. The patient concurred with the proposed plan, giving informed consent. The patient was injected with IC green intravenously prior to the procedure start time. The patient was taken to the Operating Room. The patient was identified as Nicolas Mejia, and the procedure verified as Robotic Assisted Laparoscopic Cholecystectomy with Firefly, possible Intraoperative Cholangiogram, possible open. The patient was placed on the OR table in the supine position. Prior to the induction of anesthesia, antibiotic prophylaxis was administered. General endotracheal anesthesia was administered and tolerated well. The patient's abdomen was prepped with Chloraprep and draped in the usual sterile fashion. A Time Out was performed, and the above information was confirmed. Using a 15 blade, a transverse incision was made below the umbilicus after injecting the local anesthetic. The abdominal wall was elevated with towel clips. Using the optiview technique, an 8 mm trocar was introduced into the abdominal cavity. Insufflation was provided through this trocar to establish a pneumoperitoneum of 15 mmHg which the patient tolerated.   The camera was inserted through the trocar. The RUQ was visualized, and there were no adhesions noted to prevent a laparoscopic approach. The local anesthetic was injected at all intended trocar sites. Two 8 mm trocars were placed approximately 10 cm lateral to the camera port on either side. A 5 mm trocar was placed in the RLQ along the anterior axillary line for the assistant. The patient was positioned in reverse Trendelenburg and rotated slightly toward the left. The robotic arms were docked. At this time, I scrubbed out and went to the surgeon console. I took control of the robotic arms for the major portion of the procedure. Attention was turned to the right upper quadrant. The liver appeared grossly normal.  The fundus of the gallbladder was grasped and retracted over the dome of the liver. There were no adhesions to the gallbladder. The gallbladder appeared grossly normal.  The infundibulum was grasped and retracted laterally. Using blunt dissection and cautery, the cystic duct was carefully dissected out and clearly visualized entering the gallbladder. The biliary anatomy was confirmed with firefly. The cystic artery was identified posterior to this within Calot's triangle. It was dissected out and clearly visualized entering the gallbladder as well. Once the critical view was obtained, the cystic duct was clipped twice proximally and once distally with Hemolock clips and divided with the endoscissors. The cystic artery was clipped and divided in similar fashion. Traction was then placed on the gallbladder as it was carefully dissected from the liver bed in retrograde fashion with cautery. Hemostasis was obtained along the liver bed as needed. There was spillage of a small amount of bile during the dissection. Prior to removing the gallbladder, the RUQ was inspected.   The clips along the cystic duct were in place with no evidence of any bile leakage, and the clips along the cystic artery were in place with no evidence of any bleeding. The right upper quadrant and gallbladder fossa were gently irrigated with saline until effluent was clear. The infraumbilical trocar was up-sized to a 12 mm trocar. The gallbladder was retrieved intact via an Endocatch bag and pulled out through the infraumbilical incision. At this time, I scrubbed back in and went to the patient's bedside. The fascia at the infraumbilical incision was closed with a 0-0 Vicryl using the Crossbow device. The LUQ and RLQ trocars were removed under direct laparoscopic visualization, and there was no bleeding noted from either site. The laparoscope was removed, and the abdomen was decompressed. The RUQ trocar was then removed. The gallbladder was opened on the back table. Multiple tiny stones were present within the gallbladder, but no polyps were visualized. The gallbladder was passed off as a specimen. Hemostasis was obtained within the wounds as needed with cautery. The skin at all sites was approximated with 4-0 Monocryl in the usual subcuticular fashion. The wounds were cleaned and dried, and steri-strips and Bandaids were applied. The patient was extubated in the room. Estimated Blood Loss:  Less than 25 ml. Trenton Sethi Specimen:   ID Type Source Tests Collected by Time Destination   1 : GALLBLADDER Preservative Abdomen  Flory Terrell MD 3/19/2020 1167 Pathology               Implants:  None. Findings:   1. A grossly normal-appearing liver. 2.  A grossly normal-appearing gallbladder. 3.  Multiple tiny gallstones. 4.  No polyps in the gallbladder. Counts: All sponge, needle, and instrument counts were correct x 2. Complications:    None. Disposition:   The patient was transferred to the recovery room in stable room, having tolerated the procedure and anesthesia well. Signed By: Cameron Fiore MD     March 19, 2020            CC: None

## 2020-03-19 NOTE — ANESTHESIA PREPROCEDURE EVALUATION
Relevant Problems   No relevant active problems       Anesthetic History   No history of anesthetic complications            Review of Systems / Medical History  Patient summary reviewed and pertinent labs reviewed    Pulmonary                Comments: vaping   Neuro/Psych         Psychiatric history     Cardiovascular                  Exercise tolerance: >4 METS     GI/Hepatic/Renal  Within defined limits              Endo/Other  Within defined limits           Other Findings              Physical Exam    Airway  Mallampati: II  TM Distance: 4 - 6 cm  Neck ROM: normal range of motion   Mouth opening: Normal     Cardiovascular  Regular rate and rhythm,  S1 and S2 normal,  no murmur, click, rub, or gallop  Rhythm: regular  Rate: normal         Dental  No notable dental hx       Pulmonary  Breath sounds clear to auscultation               Abdominal  GI exam deferred       Other Findings            Anesthetic Plan    ASA: 2  Anesthesia type: general          Induction: Intravenous  Anesthetic plan and risks discussed with: Patient

## 2020-03-19 NOTE — DISCHARGE INSTRUCTIONS
Patient Education        Cholecystectomy: What to Expect at Home  Your Recovery  After your surgery, it is normal to feel weak and tired for several days after you return home. Your belly may be swollen. If you had laparoscopic surgery, you may also have pain in your shoulder for about 24 hours. You may have gas or need to burp a lot at first, and a few people get diarrhea. The diarrhea usually goes away in 2 to 4 weeks, but it may last longer. How quickly you recover depends on whether you had a laparoscopic or open surgery. · For a laparoscopic surgery, most people can go back to work or their normal routine in 1 to 2 weeks, but it may take longer, depending on the type of work you do. This care sheet gives you a general idea about how long it will take for you to recover; however, each person recovers at a different pace. Follow the steps below to get better as quickly as possible. How can you care for yourself at home? Activity    · Rest when you feel tired. Getting enough sleep will help you recover.     · Try to walk each day. Start out by walking a little more than you did the day before. Gradually increase the amount you walk. Walking boosts blood flow and helps prevent pneumonia and constipation.     · For about 2 weeks, avoid lifting anything that would make you strain. This may include a child, heavy grocery bags and milk containers, a heavy briefcase or backpack, cat litter or dog food bags, or a vacuum .     · Avoid strenuous activities, such as biking, jogging, weightlifting, and aerobic exercise, until your doctor says it is okay.     · You may shower 24 hours after surgery, if your doctor okays it. Pat the cuts (incisions) dry. Do not take a bath for the first 2 weeks, and until after seen by your doctor.     · You may drive after 3 days and when you are no longer taking narcotic pain medicine and can quickly move your foot from the gas pedal to the brake!   You must also be able to sit comfortably for a long period of time, even if you do not plan to go far because you might get caught in traffic.     · For a laparoscopic surgery, most people can go back to work or their normal routine in 1 to 2 weeks, but it may take longer.       ·     Diet    · Eat smaller meals more often instead of fewer larger meals. You can eat a normal diet. If your stomach is upset, try bland, low-fat foods like plain rice, broiled chicken, toast, and yogurt, and avoid eating fatty foods for about 1 month. Fatty foods include hamburger, whole milk, cheese, and many snack foods.      · Drink plenty of fluids (unless your doctor tells you not to).   · If you have diarrhea, try avoiding spicy foods, dairy products, fatty foods, and alcohol. You can also watch to see if specific foods cause it, and stop eating them. If the diarrhea continues for more than 2 weeks, contact your doctor.     · You may notice that your bowel movements are not regular right after your surgery. This is common. Try to avoid constipation and straining with bowel movements. You may want to take a fiber supplement every day. If you have not had a bowel movement after a couple of days, take Miralax, Milk of Magnesia, prune juice, or other laxative until bowel movements are regular. Medicines    · You can restart your medicines. Your doctor will also give you instructions about taking any new medicines.     ·      · Take pain medicines exactly as directed. ? If the doctor gave you a prescription medicine for pain, take it as prescribed. ? If you are not taking a prescription pain medicine, take an over-the-counter medicine such as acetaminophen (Tylenol), ibuprofen (Advil, Motrin), or naproxen (Aleve). Read and follow all instructions on the label. ? Do not take two or more pain medicines at the same time unless the doctor told you to. Many pain medicines contain acetaminophen, which is Tylenol.   Too much Tylenol can be harmful.     · If you think your pain medicine is making you sick to your stomach:  ? Take your medicine after meals (unless your doctor tells you not to). ? Ask your doctor for a different pain medicine.     · If your doctor prescribed antibiotics, take them as directed. Do not stop taking them just because you feel better. You need to take the full course of antibiotics. Incision care    · Remove your bandages on Saturday, 3/21. You may leave your incisions uncovered. · If you have strips of tape on the incision, or cut, leave the tape on for a week and then remove them!     · After 24 hours, wash the area daily with warm, soapy water, and pat it dry.     ·      · Keep the incisions clean and dry. You may cover it with a gauze bandage if it weeps or rubs against clothing. Change the bandage every day as needed. Ice    · To reduce swelling and pain, put ice or a cold pack on your belly for 10 to 20 minutes at a time. Do this every 1 to 2 hours. Put a thin cloth between the ice and your skin. Follow-up care is a key part of your treatment and safety. Be sure to make and go to all appointments, and call your doctor if you are having problems. It's also a good idea to know your test results and keep a list of the medicines you take. When should you call for help? Call 911 anytime you think you may need emergency care. For example, call if:    · You passed out (lost consciousness).     · You are short of breath. Meenu Seton your doctor now or seek immediate medical care if:    · You are sick to your stomach and cannot drink fluids.     · You have abdominal pain that does not get better when you take your pain medicine. · Your eyes turn jaundice (yellow).     · You cannot pass stool or gas.     · You have signs of infection, such as:  ? Increased pain, swelling, warmth, or redness. ? Red streaks leading from the incision. ? Pus draining from the incision. ?  A fever > 101.     · Bright red blood has soaked through the bandage over your incision.     · You have loose stitches, or your incision comes open.     · You have signs of a blood clot in your leg (called a deep vein thrombosis), such as:  ? Pain in your calf, back of knee, thigh, or groin. ? Redness and swelling in your leg or groin.    Watch closely for any changes in your health, and be sure to contact your doctor if you have any problems. Where can you learn more? Go to http://shivani-adria.info/  Enter F357 in the search box to learn more about \"Cholecystectomy: What to Expect at Home. \"  Current as of: August 11, 2019Content Version: 12.4  © 0352-9910 [employer]. Care instructions adapted under license by [employer] (which disclaims liability or warranty for this information). If you have questions about a medical condition or this instruction, always ask your healthcare professional. [place] [address] any warranty or liability for your use of this information. Carol Baig. Daryle Grew., MD, FACS  General Surgery at [address] [place] [address], [address] [place] [address] [facility] [facility]  [phone]  Fax [phone]          DISCHARGE SUMMARY from Your Nurse    PATIENT INSTRUCTIONS:    AFTER ANESTHESIA & SEDATION, and WHILE TAKING PAIN MEDICINE  After general anesthesia / intravenous sedation and the 24 hours following, and/or while taking prescription Opiates:  · Limit your activities  · Do not drive and operate hazardous machinery until you have been of all narcotics and sedatives for over 24 hours  · Do not make important personal or business decisions  · Do  not drink alcoholic beverages  · If you have not urinated within 8 hours after discharge, please contact your surgeon on call.         SIGNS OF INFECTION, THINGS TO REPORT TO YOUR DOCTOR  Report the following Signs of Infection or General Problems after surgery to your surgeon:  · Excessive pain, swelling, redness, drainage, pus or odor of or around the surgical area  · Fever/ temperature over 101; Temperature over 100 if on medications (chemotherapy or medicines which affect your ability to fight infections)  · Nausea and vomiting lasting longer than 4 hours or if unable to take medications  · Any signs of decreased circulation or nerve impairment to extremity: change in color, persistent  numbness, tingling, coldness or increase pain  · If you have any questions. GOOD HELP TO FIGHT AN INFECTION  Here are a few tip to help reduce the chance of getting an infection after surgery:   Wash Your Hands   Good handwashing is the most important thing you and your caregiver can do.  Wash before and after caring for any wounds. Dry your hand with a clean towel.  Wash with soap and water for at least 20 seconds. A TIP: sing the \"Happy Birthday\" song through one time while washing to help with the timing.  Use a hand  in between washings.  Shower   When your surgeon says it is OK to take a shower, use a new bar of antibacterial soap (if that is what you use, and keep that bar of soap ONLY for your use), or antibacterial body wash.  Use a clean wash cloth or sponge when you bathe.  Dry off with a clean towel  after every bath - be careful around any wounds, skin staples, sutures or surgical glue over/on wounds.  Do not enter swimming pools, hot tubs, lakes, rivers and/or ocean until wounds are healed and your doctor/surgeon says it is OK.  Use Clean Sheets   Sleep on freshly laundered sheets after your surgery.  Keep the surgery site covered with a clean, dry bandage (if instructed to do so). If the bandage becomes soiled, reapply a new, dry, clean bandage.  Do not allow pets to sleep with you while your wound is healing.  Lifestyle Modification and Controlling Your Blood Sugar   Smoking slows wound healing.   Stop smoking and limit exposure to second-hand smoke.  High blood sugar slows wound healing. Eat a well-balanced diet to provide proper nutrition while healing   Monitor your blood sugar (if you are a diabetic) and take your medications as you are suppose to so you can control you blood sugar after surgery. COUGH AND DEEP BREATHE  Breathing deep and coughing are very important exercises to do after surgery. Deep breathing and coughing open the little air tubes and air sacks in your lungs. You take deep breaths every day. You may not even notice - it is just something you do when you sigh or yawn. It is a natural exercise you do to keep these air passages open. After surgery, take deep breaths and cough, on purpose. Coughing and deep breathing help prevent bronchitis and pneumonia after surgery. If you had chest or belly surgery, use a pillow as a \"hug leo\" and hold it tightly to your chest or belly when you cough. DIRECTIONS:  1. Take 10 to 15 slow deep breaths every hour while awake. 2. Breathe in deeply, and hold it for 2 seconds. 3. Exhale slowly through puckered lips, like blowing up a balloon. 4. After every 4th or 5th deep breath, hug your pillow to your chest or belly and give a hard, deep cough. Yes, it will probably hurt if you had abdominal surgery. But doing this exercise is very important part of healing after surgery. Take your pain medicine to help you do this exercise without too much pain. ANKLE PUMPS    Ankle pumps increase the circulation of oxygenated blood to your lower extremities and decrease your risk for circulation problems such as blood clots. They also stretch the muscles, tendons and ligaments in your foot and ankle, and prevent joint contracture in the ankle and foot, especially after surgeries on the legs. It is important to do ankle pump exercises regularly after surgery because immobility increases your risk for developing a blood clot.   Your doctor may also have you take an Aspirin for the next few days as well. If your doctor did not ask you to take an Aspirin, consult with him before starting Aspirin therapy on your own. The exercise is quite simple. · Slowly point your foot forward, feeling the muscles on the top of your lower leg stretch, and hold this position for 5 seconds. · Next, pull your foot back toward you as far as possible, stretching the calf muscles, and hold that position for 5 seconds. · Repeat with the other foot. · Perform 10 repetitions every hour while awake for both ankles if possible (down and then up with the foot once is one repetition). You should feel gentle stretching of the muscles in your lower leg when doing this exercise. If you feel pain, or your range of motion is limited, don't push too hard. Only go the limit your joint and muscles will let you go. If you have increasing pain, progressively worsening leg warmth or swelling, STOP the exercise and call your doctor. Other Wound Care information:  [] No additional recommendations. Carbon Dioxide and a Sore Abdomen    Taking deep breaths and coughing regularly will help with the abdominal pain that can sometimes radiate to your back and shoulders. This pain is caused by residual carbon dioxide gas used during your surgery. The gas will be reabsorbed by the body and exhaled through the lungs over the course of the next few days. Coughing and deep breathing will help. Below is information on the medication(s) your doctor is prescribing for you: The maximum daily dose of acetaminophen was discussed with the patient. She was encouraged not to exceed 3,000 mg of acetaminophen during a 24 hour period and was asked to keep in mind that acetaminophen can also be found in many over-the-counter cold medications as well as narcotics that may be given for pain.  The patient expresses understanding of these issues and questions were answered. 4 THINGS ABOUT PAIN MEDICINE I ALWAYS TALK ABOUT:  There are 4 side effects I always talk about for pain medications. 1. They make you sleepy and drowsy. Do not drive a car or operate machines while taking pain medication. Do not make any major decisions. Take a nap. Relax. Let your body recover from the affects of anesthesia and surgery. 2. Some people have quite a problem with itching and. ..  3. Nausea or vomiting. I mention these together because research tends to suggest there is a gene-related issue. While some have a hard time with these problems, others do not. These are expected and know side effects. Over-the-counter Benadryl® (the drug name is diphenhydramine) can help with the itching. Your doctor may also have given you some medicine for nausea. IF HE DID NOT, CALL HIM/HER. 4. Last but not least is the problem of constipation (not juan jose able to have a bowel movement - poop.)  All pain medicine can slow down the movement of food through the gut. The slower it goes, the worse it can be. Frankly, this means hard poop adding insult to the injury of surgery. And if you had tummy surgery, like having your gall bladder removed or a hernia repair, YOU DO NOT WANT THIS PROBLEM. There are 4 things I recommend. · Drink lots of fluids. For healthy people with no heart problems, this means at least 64 ounces of liquids or more per day. For example, a Big Gulp® from [employer] is 32 ounces. So you need to drink at least 2  Big Gulp®'s of fluids every day. If you have heart problems you may not be able to do this. Talk to your doctor about what you should do to prevent constipation. · Drinking fruit juice like apple, pear, or prune juice gives you extra \"BANG\" for your beverage. These drinks are high in natural fiber.   If you are a diabetic, drink sugar-free fluids with fiber additives (see next 2 points.)  Avoid drinking extra fruit juice unless this is a regular part of your diet plan. · Eat extra fresh fruits and vegetables. · Add extra fiber-products. Fiber products like Metamucil®, Citrucel®, Miralax® or Benefiber® can help. These products are over-the-counter and you do not need a prescription from your doctor. If you have followed these recommendations and still have some difficulty having a good poop, take and over-the-counter stimulant like Dulcolax® (biscodyl)  or Senokot® (senna concentrate). These may help get things moving. Carlo Oliveros MEDICATION AND   SIDE EFFECT GUIDE    The [facility] MEDICATION AND SIDE EFFECT GUIDE was provided to the PATIENT AND CARE PROVIDER. Information provided includes instruction about drug purpose and common side effects for the following medications:   · PERCOCET    Medication information added to discharge record on March 19, 2020 at 1:14 PM.      Some information we wish all of our patients to be familiar with and General Information for Healthy Lifestyle choices:    · Make a list of your current medications with your Primary Care Provider. · Update this list whenever your medications are discontinued, doses are changed, or new medications (including over-the-counter products like ibuprofen, vitamins, or herbal remedies) are added. · Carry medication information at all times in case of emergency situations      No smoking / No tobacco products / Avoid exposure to second hand smoke    Surgeon General's Warning:  Quitting smoking now greatly reduces serious risk to your health. Obesity, smoking, and sedentary lifestyle greatly increases your risk for illness. A healthy diet, regular physical exercise & weight monitoring are important for maintaining a healthy lifestyle. A Note About Congestive Heart Failure:   You may be retaining fluid if you have a history of heart failure or if you experience any of the following symptoms:      · Weight gain of 3 pounds or more overnight or 5 pounds in a week  · Increased swelling in our hands or feet  · Shortness of breath while lying flat in bed      Please call your doctor as soon as you notice any of these symptoms; do not wait until your next office visit. A Note About Strokes:  Recognize signs and symptoms of STROKE. The simple mnemonic, F.A.S.T., can help you remember signs of a stroke and what to do if you suspect a stroke is occuring to you or someone you are with:    F - Face looks uneven  A - Arms unable to move, or move evenly  S - Speech is slurred or non-existent  T - Time - CALL 911 as soon as signs and symptoms begin - DO NOT go to bed or wait to see if you get better - TIME IS BRAIN. Warning Signs of HEART ATTACK   Call 911 if you have these symptoms:     Chest discomfort. Most heart attacks involve discomfort in the center of the chest that lasts more than a few minutes, or that goes away and comes back. It can feel like uncomfortable pressure, squeezing, fullness, or pain.  Discomfort in other areas of the upper body. Symptoms can include pain or discomfort in one or both arms, the back, neck, jaw, or stomach.  Shortness of breath with or without chest discomfort.  Other signs may include breaking out in a cold sweat, nausea, or lightheadedness. Don't wait more than five minutes to call 911 - MINUTES MATTER! Fast action can save your life. Calling 911 is almost always the fastest way to get lifesaving treatment. Emergency Medical Services staff can begin treatment when they arrive -- up to an hour sooner than if someone gets to the hospital by car. AT THE COMPLETION OF DISCHARGE INSTRUCTION REVIEW, WE VERIFY:  The discharge information has been reviewed with the patient and caregiver. Questions have been asked and answered meeting patient and caregiver expectations. The patient and caregiver verbalized understanding.     Your discharge nurse was Marly Mcarthur RN-BC       Board Certified - Pain Management      CONTENTS FOUND IN YOUR DISCHARGE ENVELOPE:  [x]     Surgeon and Hospital Discharge Instructions  [x]     [facility] Care Provider Card  [x]     Medication & Side Effect Guide            (your newly prescribed medications have been marked/highlighted showing the most common side effects from the classes of drugs on your prescriptions)  [x]     Medication Prescription(s) x 1 ( [] These have been sent electronically to your pharmacy by your surgeon,   - OR -       your surgeon has already provided these to you during a previous/pre-op office visit)  []     Physical Therapy Prescription  []     Follow-up Appointment Cards  []     Surgery-related Pictures/Media  []     Pain block and/or block with On-Q Catheter from Anesthesia Service (information included in your instructions above)  []     Medical device information sheets/pamphlets from their    []     School/work excuse note. []     /parent work excuse note. The following personal items collected during your admission for safe keeping are returned to you:     Dental Appliance: Dental Appliances: None  Vi az:    Hearing Aid:    Jewelry: Jewelry: None  Clothing: Clothing: Footwear, Jacket/Coat, Pants, Shirt, Undergarments  Other Valuables:  Other Valuables: None  Valuables sent to safe:

## 2020-03-19 NOTE — PERIOP NOTES
PACU IN REPORT FROM ANESTHESIA    Verbal report received from   [facility]   [] MD/DO-Anesthesiologist    [x] CRNA   [] with student    CHOICE ANESTHESIA:  [x] GENERAL  [] MAC  [] LOCAL  [] REGIONAL  [] SPINAL   [] EPIDURAL   **Note the anesthesia record for medications given intraoperatively. **           [] ERAS PROTOCOL    SURGICAL PROCEDURE: Procedure(s) (LRB):  ROBOTIC ASSISTED LAPAROSCOPIC CHOLECYSTECTOMY WITH FIREFLY (N/A)     SURGEON: Jayson Obrien MD.    Brief Initial Visual Assessment:    Patient Age: [] Infant(1-12mo)      []Pediatric(1-13yrs)    [] Adolescent(13-18yrs)    [x] Adult(18-65yrs)      []Geriatric Adult(>65yrs). Patient    [] Alert           []Calm & Cooperative      [] Anxious  Appearance: [] Drowsy      [x] Sedated      [x] Unresponsive     Oriented x                Airway:     [x] Patent                          [] Near-obstructed   [] Obstructed                        [] Airway improved with head/airway repositioning                       Airway Adjuncts Present: [x] Oral Airway    [] Nasal Trumpet    [] ETT    [] LMA            Respiratory  [x] Even   [] Labored   [] Shallow   [] Tachypnea   [] Bradypnea  Pattern:    [x] Non-Labored  [] VENT and/or respiratory assistance     being provided. Skin:     [x] Pink [] Dusky    [] Pale        [x] Warm    [] Hot [] Cool       [] Cold   [x]Dry [] Moist [] Diaphoretic     Membranes:  [x] Pink [] Pale       [x] Moist [] Dry     [] Crusty     Pain:   [x] No Acute Discomfort. 0    /10 Scale [] Verbal Numeric   [] Moderate Discomfort.      [] V.A.S. [] Acute Discomfort. [x] A.N.V.    [] Chronic-Issue Related Discomfort.   [] F.L.A.C.C. Note E-MAR for medications administered. []Faces, Mcfarlane/Baker    Note assessments documented in flowsheets; any assessment variants to be found in comments or narrative perioperative nurse notes.        Post-anesthesia care now assumed by [facility] BSN, RN-BC

## 2020-03-20 ENCOUNTER — TELEPHONE (OUTPATIENT)
Dept: SURGERY | Age: 25
End: 2020-03-20

## 2020-03-20 NOTE — TELEPHONE ENCOUNTER
I called the patient for her post op call and unable to leave her a message as her mailbox is full. Will try again later.

## 2020-03-31 ENCOUNTER — TELEPHONE (OUTPATIENT)
Dept: SURGERY | Age: 25
End: 2020-03-31

## 2020-03-31 NOTE — LETTER
NOTIFICATION OF RETURN TO WORK / SCHOOL 
 
4/1/2020 9:20 AM 
 
Ms. Trina Hagan [address] [facility] To Whom It May Concern: Trina Hagan was under the care of [facility] [address] from 3/19/2020 to present. She will be able to return to work/school on 4/4/2020 with no restrictions. If there are questions or concerns please have the patient contact our office.  
 
Sincerely, 
 
 
Juan Coyle MD

## 2020-03-31 NOTE — TELEPHONE ENCOUNTER
Patient identified with two patient identifiers. Patient states she needs letter with return to work date of 4/4/2020. Per patient Dr. Andrey Fang informed her she would be able to return to work 2 weeks after date of surgery. Patient has no current complaints. She is scheduled for virtual visit 4/2/2020. Letter will be completed and emailed to her per request.  She will return call if any other questions or concerns.

## 2020-03-31 NOTE — TELEPHONE ENCOUNTER
Patient is Post Op. Had appointment   Friday April 3 which was CX. Would like to do a Virtual Appointment. Unless she needs to be seen in person. [phone].

## 2020-04-02 ENCOUNTER — OFFICE VISIT (OUTPATIENT)
Dept: SURGERY | Age: 25
End: 2020-04-02

## 2020-04-02 VITALS — HEIGHT: 62 IN | WEIGHT: 122 LBS | BODY MASS INDEX: 22.45 KG/M2

## 2020-04-02 DIAGNOSIS — Z90.49 S/P CHOLECYSTECTOMY: Primary | ICD-10-CM

## 2020-04-02 NOTE — PATIENT INSTRUCTIONS
Cholecystectomy: What to Expect at [address] [place] [address]  After your surgery, it is normal to feel weak and tired for several days after you return home. Your belly may be swollen. If you had laparoscopic surgery, you may also have pain in your shoulder for about 24 hours. You may have gas or need to burp a lot at first, and a few people get diarrhea. The diarrhea usually goes away in 2 to 4 weeks, but it may last longer. How quickly you recover depends on whether you had a laparoscopic or open surgery. · For a laparoscopic surgery, most people can go back to work or their normal routine in 1 to 2 weeks, but it may take longer, depending on the type of work you do. · For an open surgery, it will probably take 4 to 6 weeks before you get back to your normal routine. This care sheet gives you a general idea about how long it will take for you to recover. However, each person recovers at a different pace. Follow the steps below to get better as quickly as possible. How can you care for yourself at home? Activity    · Rest when you feel tired. Getting enough sleep will help you recover.     · Try to walk each day. Start out by walking a little more than you did the day before. Gradually increase the amount you walk. Walking boosts blood flow and helps prevent pneumonia and constipation.     · For about 2 to 4 weeks, avoid lifting anything that would make you strain. This may include a child, heavy grocery bags and milk containers, a heavy briefcase or backpack, cat litter or dog food bags, or a vacuum .     · Avoid strenuous activities, such as biking, jogging, weightlifting, and aerobic exercise, until your doctor says it is okay.     · You may shower 24 to 48 hours after surgery, if your doctor okays it. Pat the cut (incision) dry.  Do not take a bath for the first 2 weeks, or until your doctor tells you it is okay.     · You may drive when you are no longer taking pain medicine and can quickly move your foot from the gas pedal to the brake. You must also be able to sit comfortably for a long period of time, even if you do not plan to go far. You might get caught in traffic.     · For a laparoscopic surgery, most people can go back to work or their normal routine in 1 to 2 weeks, but it may take longer. For an open surgery, it will probably take 4 to 6 weeks before you get back to your normal routine.     · Your doctor will tell you when you can have sex again.    Diet    · Eat smaller meals more often instead of fewer larger meals. You can eat a normal diet, but avoid eating fatty foods for about 1 month. Fatty foods include hamburger, whole milk, cheese, and many snack foods. If your stomach is upset, try bland, low-fat foods like plain rice, broiled chicken, toast, and yogurt.     · Drink plenty of fluids (unless your doctor tells you not to).   · If you have diarrhea, try avoiding spicy foods, dairy products, fatty foods, and alcohol. You can also watch to see if specific foods cause it, and stop eating them. If the diarrhea continues for more than 2 weeks, talk to your doctor.     · You may notice that your bowel movements are not regular right after your surgery. This is common. Try to avoid constipation and straining with bowel movements. You may want to take a fiber supplement every day. If you have not had a bowel movement after a couple of days, ask your doctor about taking a mild laxative. Medicines    · Your doctor will tell you if and when you can restart your medicines. He or she will also give you instructions about taking any new medicines.     · If you take aspirin or some other blood thinner, ask your doctor if and when to start taking it again. Make sure that you understand exactly what your doctor wants you to do.     · Take pain medicines exactly as directed. ? If the doctor gave you a prescription medicine for pain, take it as prescribed.   ? If you are not taking a prescription pain medicine, take an over-the-counter medicine such as acetaminophen (Tylenol), ibuprofen (Advil, Motrin), or naproxen (Aleve). Read and follow all instructions on the label. ? Do not take two or more pain medicines at the same time unless the doctor told you to. Many pain medicines contain acetaminophen, which is Tylenol. Too much Tylenol can be harmful.     · If you think your pain medicine is making you sick to your stomach:  ? Take your medicine after meals (unless your doctor tells you not to). ? Ask your doctor for a different pain medicine.     · If your doctor prescribed antibiotics, take them as directed. Do not stop taking them just because you feel better. You need to take the full course of antibiotics. Incision care    · If you have strips of tape on the incision, or cut, leave the tape on for a week or until it falls off.     · After 24 to 48 hours, wash the area daily with warm, soapy water, and pat it dry.     · You may have staples to hold the cut together. Keep them dry until your doctor takes them out. This is usually in 7 to 10 days.     · Keep the area clean and dry. You may cover it with a gauze bandage if it weeps or rubs against clothing. Change the bandage every day. Ice    · To reduce swelling and pain, put ice or a cold pack on your belly for 10 to 20 minutes at a time. Do this every 1 to 2 hours. Put a thin cloth between the ice and your skin. Follow-up care is a key part of your treatment and safety. Be sure to make and go to all appointments, and call your doctor if you are having problems. It's also a good idea to know your test results and keep a list of the medicines you take. When should you call for help? Call 911 anytime you think you may need emergency care. For example, call if:    · You passed out (lost consciousness).     · You are short of breath. Jerilyn Hush your doctor now or seek immediate medical care if:    · You are sick to your stomach and cannot drink fluids.     · You have pain that does not get better when you take your pain medicine.     · You cannot pass stools or gas.     · You have signs of infection, such as:  ? Increased pain, swelling, warmth, or redness. ? Red streaks leading from the incision. ? Pus draining from the incision. ? A fever.     · Bright red blood has soaked through the bandage over your incision.     · You have loose stitches, or your incision comes open.     · You have signs of a blood clot in your leg (called a deep vein thrombosis), such as:  ? Pain in your calf, back of knee, thigh, or groin. ? Redness and swelling in your leg or groin.    Watch closely for any changes in your health, and be sure to contact your doctor if you have any problems. Where can you learn more? Go to http://shivani-adria.info/  Enter F357 in the search box to learn more about \"Cholecystectomy: What to Expect at Home. \"  Current as of: August 11, 2019Content Version: 12.4  © 5881-7170 Healthwise, Incorporated. Care instructions adapted under license by [employer] (which disclaims liability or warranty for this information). If you have questions about a medical condition or this instruction, always ask your healthcare professional. [address] any warranty or liability for your use of this information.

## 2020-04-02 NOTE — PROGRESS NOTES
1. Have you been to the ER, urgent care clinic since your last visit? Hospitalized since your last visit? No    2. Have you seen or consulted any other health care providers outside of the [address] [place] [address] since your last visit? Include any pap smears or colon screening.  No

## 2020-04-02 NOTE — PROGRESS NOTES
Subjective:    I was in the office while conducting this encounter. Consent:  She and/or her healthcare decision maker is aware that this patient-initiated Telehealth encounter is a billable service, with coverage as determined by her insurance carrier. She is aware that she may receive a bill and has provided verbal consent to proceed: Yes    This virtual visit was conducted telephone encounter only. -  I affirm this is a Patient Initiated Episode with an Established Patient who has not had a related appointment within my department in the past 7 days or scheduled within the next 24 hours. Note: this encounter is not billable if this call serves to triage the patient into an appointment for the relevant concern. Total Time: minutes: 11-20 minutes. Jorje Morris is a 21 y.o. female presents for postop care 13 days following Lap cholecystectomy   Appetite is good. Eating a regular diet without difficulty. Bowel movements are diarrhea  The patient is voiding without difficulty. She is complaining of soreness at her incisions sites. She has not been taking anything for pain. She is taking pepcid as needed. Patient has an advanced directive:  Not on file. Ms. Nicky Nguyen has a reminder for a \"due or due soon\" health maintenance. I have asked that she contact her primary care provider for follow-up on this health maintenance. Objective:     Visit Vitals  Ht 5' 2\" (1.575 m)   Wt 122 lb (55.3 kg)   BMI 22.31 kg/m²       Incisions are healing well per patient. Assessment:     Doing well postoperatively. Plan:     1. May RTW on 4/4/2020  2. May increase activity as tolerated. 3. May take ibuprofen for pain. 4. May take a bath. 5. Follow up as needed. Pt verbalized understanding and questions were answered to the best of my knowledge and ability.

## 2020-11-03 ENCOUNTER — TELEPHONE (OUTPATIENT)
Dept: FAMILY MEDICINE CLINIC | Age: 25
End: 2020-11-03

## 2020-11-03 NOTE — TELEPHONE ENCOUNTER
----- Message from Janet Chau sent at 11/2/2020  4:02 PM EST -----  Regarding: Dr. Anne Reid telephone  General Message/Vendor Calls    Caller's first and last name: Pt      Reason for call: Np est PCP      Callback required yes/no and why: yes, sched      Best contact number(s): [phone]      Details to clarify the request: n/a      Janet Chau

## 2021-05-07 ENCOUNTER — APPOINTMENT (OUTPATIENT)
Dept: CT IMAGING | Age: 26
End: 2021-05-07
Attending: EMERGENCY MEDICINE
Payer: OTHER MISCELLANEOUS

## 2021-05-07 ENCOUNTER — HOSPITAL ENCOUNTER (EMERGENCY)
Age: 26
Discharge: HOME | End: 2021-05-08
Attending: EMERGENCY MEDICINE
Payer: OTHER MISCELLANEOUS

## 2021-05-07 VITALS
HEART RATE: 104 BPM | BODY MASS INDEX: 26.68 KG/M2 | DIASTOLIC BLOOD PRESSURE: 95 MMHG | TEMPERATURE: 98.5 F | RESPIRATION RATE: 20 BRPM | WEIGHT: 145 LBS | OXYGEN SATURATION: 100 % | SYSTOLIC BLOOD PRESSURE: 143 MMHG | HEIGHT: 62 IN

## 2021-05-07 DIAGNOSIS — S16.1XXA STRAIN OF NECK MUSCLE, INITIAL ENCOUNTER: Primary | ICD-10-CM

## 2021-05-07 DIAGNOSIS — S39.012A BACK STRAIN, INITIAL ENCOUNTER: ICD-10-CM

## 2021-05-07 DIAGNOSIS — S63.501A WRIST SPRAIN, RIGHT, INITIAL ENCOUNTER: ICD-10-CM

## 2021-05-07 PROCEDURE — 72128 CT CHEST SPINE W/O DYE: CPT

## 2021-05-07 PROCEDURE — 96374 THER/PROPH/DIAG INJ IV PUSH: CPT

## 2021-05-07 PROCEDURE — 72125 CT NECK SPINE W/O DYE: CPT

## 2021-05-07 PROCEDURE — 93005 ELECTROCARDIOGRAM TRACING: CPT

## 2021-05-07 PROCEDURE — 99284 EMERGENCY DEPT VISIT MOD MDM: CPT

## 2021-05-07 PROCEDURE — 72131 CT LUMBAR SPINE W/O DYE: CPT

## 2021-05-07 RX ORDER — DIAZEPAM 5 MG/1
5 TABLET ORAL
Status: COMPLETED | OUTPATIENT
Start: 2021-05-07 | End: 2021-05-08

## 2021-05-07 RX ORDER — KETOROLAC TROMETHAMINE 30 MG/ML
30 INJECTION, SOLUTION INTRAMUSCULAR; INTRAVENOUS
Status: COMPLETED | OUTPATIENT
Start: 2021-05-07 | End: 2021-05-08

## 2021-05-07 NOTE — Clinical Note
[address] 
[facility] EMERGENCY DEPT 
[address] [facility] [place] [id number] [phone] Work/School Note Date: 5/7/2021 To Whom It May concern: Vika Handy was seen and treated today in the emergency room by the following provider(s): 
Attending Provider: Francisco Stahl MD.   
 
Vika Handy is excused from work/school on 5/8/2021 through 5/11/2021. She is medically clear to return to work/school on 5/12/2021. Sincerely, Virginia Brewer MD

## 2021-05-07 NOTE — Clinical Note
[address] 
[facility] EMERGENCY DEPT 
[address] [place] [address] [phone] Work/School Note Date: 5/7/2021 To Whom It May concern: Nadege Kelly was seen and treated today in the emergency room by the following provider(s): 
Attending Provider: Gabrielle Gottlieb MD.   
 
Nadege Kelly is excused from work/school on 5/8/2021 through 5/11/2021. She is medically clear to return to work/school on 5/12/2021. Sincerely, Zenobia Song MD

## 2021-05-08 ENCOUNTER — APPOINTMENT (OUTPATIENT)
Dept: MRI IMAGING | Age: 26
End: 2021-05-08
Attending: EMERGENCY MEDICINE
Payer: OTHER MISCELLANEOUS

## 2021-05-08 ENCOUNTER — APPOINTMENT (OUTPATIENT)
Dept: GENERAL RADIOLOGY | Age: 26
End: 2021-05-08
Attending: EMERGENCY MEDICINE
Payer: OTHER MISCELLANEOUS

## 2021-05-08 PROCEDURE — 96375 TX/PRO/DX INJ NEW DRUG ADDON: CPT

## 2021-05-08 PROCEDURE — 72148 MRI LUMBAR SPINE W/O DYE: CPT

## 2021-05-08 PROCEDURE — 73110 X-RAY EXAM OF WRIST: CPT

## 2021-05-08 PROCEDURE — 74011250637 HC RX REV CODE- 250/637: Performed by: EMERGENCY MEDICINE

## 2021-05-08 PROCEDURE — 72146 MRI CHEST SPINE W/O DYE: CPT

## 2021-05-08 PROCEDURE — 74011250636 HC RX REV CODE- 250/636: Performed by: EMERGENCY MEDICINE

## 2021-05-08 PROCEDURE — 96374 THER/PROPH/DIAG INJ IV PUSH: CPT

## 2021-05-08 PROCEDURE — 72141 MRI NECK SPINE W/O DYE: CPT

## 2021-05-08 RX ORDER — DIAZEPAM 10 MG/2ML
5 INJECTION INTRAMUSCULAR
Status: COMPLETED | OUTPATIENT
Start: 2021-05-08 | End: 2021-05-08

## 2021-05-08 RX ORDER — CYCLOBENZAPRINE HCL 10 MG
10 TABLET ORAL
Qty: 30 TAB | Refills: 0 | Status: SHIPPED | OUTPATIENT
Start: 2021-05-08 | End: 2022-10-18

## 2021-05-08 RX ORDER — METHYLPREDNISOLONE 4 MG/1
TABLET ORAL
Qty: 1 DOSE PACK | Refills: 0 | Status: SHIPPED | OUTPATIENT
Start: 2021-05-08 | End: 2022-10-18

## 2021-05-08 RX ORDER — IBUPROFEN 800 MG/1
800 TABLET ORAL
Qty: 20 TAB | Refills: 0 | Status: SHIPPED | OUTPATIENT
Start: 2021-05-08 | End: 2021-05-15

## 2021-05-08 RX ADMIN — DIAZEPAM 5 MG: 5 TABLET ORAL at 00:28

## 2021-05-08 RX ADMIN — KETOROLAC TROMETHAMINE 30 MG: 30 INJECTION, SOLUTION INTRAMUSCULAR; INTRAVENOUS at 00:34

## 2021-05-08 RX ADMIN — SODIUM CHLORIDE 1000 ML: 9 INJECTION, SOLUTION INTRAVENOUS at 00:34

## 2021-05-08 RX ADMIN — DIAZEPAM 5 MG: 5 INJECTION, SOLUTION INTRAMUSCULAR; INTRAVENOUS at 03:03

## 2021-05-08 NOTE — ED PROVIDER NOTES
The patient is a 65-year-old female with a past medical history significant for ADHD, arrhythmia, PTSD, status post lap cholecystectomy who presents to the ED as stated that while she was walking while attempting to lift the patient to the stretcher from the floor she began experiencing severe neck pain that went down to her into her right arm and shoulder, accompanied by nausea, dizziness, chest pain, intermittent numbness. She denies any fever or chills, sore throat, cough or congestion, headache, chest pain or shortness of breath with exertion, vomiting, diarrhea, constipation, dysuria, hematuria, extremity weakness or numbness, sick contact, skin rash and recent travel. Her last menstrual period was 3 weeks ago. Past Medical History:   Diagnosis Date    ADHD     Arrhythmia     tachycardia    Non-nicotine vapor product user     Psychiatric disorder     PTSD, ADHD, Insomnia    PTSD (post-traumatic stress disorder)        Past Surgical History:   Procedure Laterality Date    HX LAP CHOLECYSTECTOMY  03/19/2020         Family History:   Problem Relation Age of Onset    Cancer Father         thyroid    Diabetes Maternal Grandmother     Hypertension Maternal Grandmother     Diabetes Maternal Grandfather     Hypertension Maternal Grandfather        Social History     Socioeconomic History    Marital status: SINGLE     Spouse name: Not on file    Number of children: Not on file    Years of education: Not on file    Highest education level: Not on file   Occupational History    Not on file   Social Needs    Financial resource strain: Not on file    Food insecurity     Worry: Not on file     Inability: Not on file    Transportation needs     Medical: Not on file     Non-medical: Not on file   Tobacco Use    Smoking status: Former Smoker    Smokeless tobacco: Current User    Tobacco comment: not regural smoker   Substance and Sexual Activity    Alcohol use:  Yes     Alcohol/week: 2.0 standard drinks     Types: 2 Shots of liquor per week    Drug use: Yes     Types: Marijuana     Comment: 2 weeks ago    Sexual activity: Yes     Birth control/protection: Pill   Lifestyle    Physical activity     Days per week: Not on file     Minutes per session: Not on file    Stress: Not on file   Relationships    Social connections     Talks on phone: Not on file     Gets together: Not on file     Attends Latter-day service: Not on file     Active member of club or organization: Not on file     Attends meetings of clubs or organizations: Not on file     Relationship status: Not on file    Intimate partner violence     Fear of current or ex partner: Not on file     Emotionally abused: Not on file     Physically abused: Not on file     Forced sexual activity: Not on file   Other Topics Concern    Not on file   Social History Narrative    Not on file         ALLERGIES: Patient has no known allergies. Review of Systems   All other systems reviewed and are negative. Vitals:    05/07/21 2239   BP: (!) 143/95   Pulse: (!) 104   Resp: 20   Temp: 98.5 °F (36.9 °C)   SpO2: 100%   Weight: 65.8 kg (145 lb)   Height: 5' 2\" (1.575 m)            Physical Exam  Vitals signs and nursing note reviewed. CONSTITUTIONAL: Well-appearing; well-nourished; in no apparent distress  HEAD: Normocephalic; atraumatic  EYES: PERRL; EOM intact; conjunctiva and sclera are clear bilaterally. ENT: No rhinorrhea; normal pharynx with no tonsillar hypertrophy; mucous membranes pink/moist, no erythema, no exudate. NECK: Supple; cervical spine tenderness and decreased range of motion secondary to pain; no cervical lymphadenopathy  CARD: Normal S1, S2; no murmurs, rubs, or gallops. Regular rate and rhythm. RESP: Normal respiratory effort; breath sounds clear and equal bilaterally; no wheezes, rhonchi, or rales. ABD: Normal bowel sounds; non-distended; non-tender; no palpable organomegaly, no masses, no bruits.   Back Exam: Normal inspection;  thoracic and lumbosacral vertebral point tenderness, no CVA tenderness. Normal range of motion. EXT: Normal ROM in all four extremities; tenderness to palpation in the right wrist; no swelling or deformity; distal pulses are normal, no edema. SKIN: Warm; dry; no rash. NEURO:Alert and oriented x 3, coherent, ROLY-XII grossly intact, no pronator drift; normal reflexes; sensory and motor are non-focal.        MDM  Number of Diagnoses or Management Options  Back strain, initial encounter  Strain of neck muscle, initial encounter  Wrist sprain, right, initial encounter  Diagnosis management comments: Assessment: 58-year-old female who presents to the ED with a complaint of neck and back pain after lifting a heavy patient while at work this evening. She has no focal neurological findings on exam.  She is anxious, restless. Patient also complained of the same visit, benign exam with stable vital signs. Otherwise stable. She will need evaluation for thoracic, lumbar and cervical spine disease-suspect back strain and radiculopathy. Plan: CT of lumbar, thoracic and cervical spine/analgesia and muscle relaxant/serial exam/education, reassurance, symptomatic treatment/ Monitor and Reevaluate.          Amount and/or Complexity of Data Reviewed  Clinical lab tests: ordered and reviewed  Tests in the radiology section of CPT®: ordered and reviewed  Tests in the medicine section of CPT®: reviewed and ordered  Discussion of test results with the performing providers: yes  Decide to obtain previous medical records or to obtain history from someone other than the patient: yes  Obtain history from someone other than the patient: yes  Review and summarize past medical records: yes  Discuss the patient with other providers: yes  Independent visualization of images, tracings, or specimens: yes    Risk of Complications, Morbidity, and/or Mortality  Presenting problems: moderate  Diagnostic procedures: moderate  Management options: moderate  General comments: Total critical care time spent exclusive of procedures:  45 minutes    Patient Progress  Patient progress: stable         Procedures    XRAY INTERPRETATION (ED MD)  Xray of right wrist shows no fracture. No subluxation/dislocation. No bony abnormality. Mica Stanley MD      Progress Note:   Pt has been reexamined by Mica Stanley MD. Pt is feeling much better. The patient had extensive work-up without any significant findings. I believe that the patient likely has a conversion reaction. Symptoms have improved. All available results have been reviewed with pt and any available family. Pt understands sx, dx, and tx in ED. Care plan has been outlined and questions have been answered. Pt is ready to go home. Will send home on cervical and back strain instruction. Prescription of Motrin, Flexeril and Medrol Dosepak. Outpatient referral with PCP as needed. Written by Mica Stanley MD,7:11 AM    .   .

## 2021-05-08 NOTE — ED NOTES
Discharge given by provider. Pt verbalizes understanding. Pt is leaving by wheelchair with boyfriend.

## 2021-05-08 NOTE — ED NOTES
Ambulation attempted with pt per Dr. Hernandez McDade request. Pt up from supine to sitting with assistance, from sitting to standing pt was able to stand w/o assistance. Pt c/o \"knees are giving out\" while ambulating less than 10 feet. Pt was able to get back to bed and raise legs onto bed w/o assistance.

## 2021-05-08 NOTE — ED TRIAGE NOTES
Pt present to ED with neck pain, back pain, and numbness/tingling in fingers and toes since 1917 while she was lifting 300+ lbs patient off the floor. Also c/o CP when she take a deep breath. Denies SOB.

## 2021-05-10 LAB
ATRIAL RATE: 94 BPM
CALCULATED P AXIS, ECG09: 25 DEGREES
CALCULATED R AXIS, ECG10: 45 DEGREES
CALCULATED T AXIS, ECG11: 25 DEGREES
DIAGNOSIS, 93000: NORMAL
P-R INTERVAL, ECG05: 122 MS
Q-T INTERVAL, ECG07: 342 MS
QRS DURATION, ECG06: 76 MS
QTC CALCULATION (BEZET), ECG08: 427 MS
VENTRICULAR RATE, ECG03: 94 BPM

## 2022-03-19 PROBLEM — F43.10 PTSD (POST-TRAUMATIC STRESS DISORDER): Status: ACTIVE | Noted: 2020-02-26

## 2022-03-19 PROBLEM — F90.9 ADHD: Status: ACTIVE | Noted: 2018-12-13

## 2022-03-19 PROBLEM — K82.4 GALLBLADDER POLYP: Status: ACTIVE | Noted: 2020-02-26

## 2022-10-18 ENCOUNTER — OFFICE VISIT (OUTPATIENT)
Dept: NEUROLOGY | Age: 27
End: 2022-10-18
Payer: MEDICAID

## 2022-10-18 VITALS
SYSTOLIC BLOOD PRESSURE: 120 MMHG | HEART RATE: 97 BPM | HEIGHT: 62 IN | BODY MASS INDEX: 26.68 KG/M2 | OXYGEN SATURATION: 98 % | DIASTOLIC BLOOD PRESSURE: 80 MMHG | WEIGHT: 145 LBS

## 2022-10-18 DIAGNOSIS — R55 SYNCOPE, UNSPECIFIED SYNCOPE TYPE: Primary | ICD-10-CM

## 2022-10-18 PROCEDURE — 99205 OFFICE O/P NEW HI 60 MIN: CPT | Performed by: PSYCHIATRY & NEUROLOGY

## 2022-10-18 RX ORDER — BUSPIRONE HYDROCHLORIDE 15 MG/1
TABLET ORAL
COMMUNITY
Start: 2022-08-12

## 2022-10-18 NOTE — LETTER
10/18/2022    Patient: Esthela Zarate   YOB: 1995   Date of Visit: 10/18/2022     Js Garner DO  [address] [address]  [facility] [address]    Dear Chi Cruz DO,      Thank you for referring Ms. Esthela Zarate to [facility] for evaluation. My notes for this consultation are attached. If you have questions, please do not hesitate to call me. I look forward to following your patient along with you.       Sincerely,    Raven Cox MD
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
## 2022-11-03 ENCOUNTER — OFFICE VISIT (OUTPATIENT)
Dept: NEUROLOGY | Age: 27
End: 2022-11-03
Payer: MEDICAID

## 2022-11-03 DIAGNOSIS — R55 SYNCOPE, UNSPECIFIED SYNCOPE TYPE: Primary | ICD-10-CM

## 2022-11-03 PROCEDURE — 95923 AUTONOMIC NRV SYST FUNJ TEST: CPT | Performed by: PSYCHIATRY & NEUROLOGY

## 2022-11-03 PROCEDURE — 95924 ANS PARASYMP & SYMP W/TILT: CPT | Performed by: PSYCHIATRY & NEUROLOGY

## 2022-11-03 NOTE — LETTER
11/10/2022 2:28 PM    Patient:  Bandar Lares   YOB: 1995  Date of Visit: 11/3/2022      Dear Samson Lopez, [address] [address]  [address]: Thank you for referring Ms. Bandar Lares to me for ANS testing. [facility]  [facility] [address] [facility], [address] [place], [address] [facility]  Phone: [phone]  FAX: [phone]     Clinical Autonomic Testing Report     Patient ID:  Bandar Lares  [id number]  25 y.o.  1995     REFERRED BY: PCP  PCP: Sharifa Giron    Date of Testin/3/2022      Indication/History:    Bandar Lares is a 31 y.o. female Via [employer] who  has a past medical history of ADHD, Arrhythmia, Non-nicotine vapor product user, Psychiatric disorder, and PTSD (post-traumatic stress disorder). who since , patient started having generalized fatigue and passing out at the gym at frank year of . (+) chronic pain in the neck and lower back pain (+) pain in arms and legs, more on the L side. Patient seen [facility] - [facility] for fainting spells. Cardiac work up showed tachycardia Was seeing a neurologist at [address] was placed on Gabapentin. EMG/NCS 8 yrs ago was okay. Tried Cymbalta and Lyrica with no benefit. Accupunture. Saw a rheumatologist. MRI Brain and whole spine and EEG were unremarkable. Patient is coming for syncope/autonomic dysfunction evaluation. Medications taken 48 hrs before the test: Gabapentin, Trazodone     Procedure: This Autonomic Nervous System (ANS) testing is performed by utilizing [address] [place] [address] Autonomic System, with established protocol. Multiple procedures performed: Heart rate response to deep breathing (HRDB), Valsalva ratio, Heart rate and BP response to head up tilt (HUT), and Quantitative sudomotor axon reflex testing (QSWEAT) . Result:  1.  Heart response to deep  breathing (HRDB): 2 series of 6 cycles were performed and the mean of 6 consecutive cycles was obtained. Average HR difference was 16.21 and E:I ratio was 1.21. Normal response. 2. Heart rate response to Valsalva maneuver:  Srja-uu-urup BP to Valsalva was measured and BP response in all 4 phases was normal. Heart response was the opposite of BP, a normal response. ( VR = 2.18)  3. HUT (head-up tilt) : Wajl-vi-bxrs BP and HR were measured, up to 15 minutes post tilt. No significant BP reduction or HR acceleration/deceleration despite reported lightheadedness. 4. SUDOMOTOR: QSWEAT response showed relatively preserved sweat production in all 4 localities (forearm, proximal leg, distal leg, foot) of the right upper and lower limbs, comparing patient to age group. Impression:   NORMAL    Relatively preserved autonomic function for this age, despite reported lightheadedness during tilt table testing.          Germaine Zuñiga MD  Diplomate, American Board of Psychiatry and Neurology  Diplomate, Neuromuscular Medicine  Diplomate, American Board of Electrodiagnostic Medicine    Note: Raw Data will be scanned separately in Media

## 2022-11-10 NOTE — PROCEDURES
[facility]  [address] [place], [address] [place], [address] [facility]  Phone: [phone]  FAX: [phone]     Clinical Autonomic Testing Report     Patient ID:  Raquel Stubbs  [id number]  89 y.o.  1995     REFERRED BY: PCP  PCP: Terri Enriquez    Date of Testin/3/2022      Indication/History:    Raquel Stubbs is a 31 y.o. female Via [employer] who  has a past medical history of ADHD, Arrhythmia, Non-nicotine vapor product user, Psychiatric disorder, and PTSD (post-traumatic stress disorder). who since , patient started having generalized fatigue and passing out at the gym at frank year of . (+) chronic pain in the neck and lower back pain (+) pain in arms and legs, more on the L side. Patient seen [facility] cardiology - [facility] for fainting spells. Cardiac work up showed tachycardia Was seeing a neurologist at [address] was placed on Gabapentin. EMG/NCS 8 yrs ago was okay. Tried Cymbalta and Lyrica with no benefit. Accupunture. Saw a rheumatologist. MRI Brain and whole spine and EEG were unremarkable. Patient is coming for syncope/autonomic dysfunction evaluation. Medications taken 48 hrs before the test: Gabapentin, Trazodone     Procedure: This Autonomic Nervous System (ANS) testing is performed by utilizing [address] [place] [address] Autonomic System, with established protocol. Multiple procedures performed: Heart rate response to deep breathing (HRDB), Valsalva ratio, Heart rate and BP response to head up tilt (HUT), and Quantitative sudomotor axon reflex testing (QSWEAT) . Result:  Heart response to deep  breathing (HRDB): 2 series of 6 cycles were performed and the mean of 6 consecutive cycles was obtained. Average HR difference was 16.21 and E:I ratio was 1.21. Normal response.   Heart rate response to Valsalva maneuver:  [id number]-pb-[id number] BP to Valsalva was measured and BP response in all 4 phases was normal. Heart response was the opposite of BP, a normal response. ( VR = 2.18)  HUT (head-up tilt) : [id number]-[id number] BP and HR were measured, up to 15 minutes post tilt. No significant BP reduction or HR acceleration/deceleration despite reported lightheadedness. SUDOMOTOR: QSWEAT response showed relatively preserved sweat production in all 4 localities (forearm, proximal leg, distal leg, foot) of the right upper and lower limbs, comparing patient to age group. Impression:   NORMAL    Relatively preserved autonomic function for this age, despite reported lightheadedness during tilt table testing.          Nereida Driscoll MD  Diplomate, American Board of Psychiatry and Neurology  Diplomate, Neuromuscular Medicine  Diplomate, American Board of Electrodiagnostic Medicine    Note: Raw Data will be scanned separately in Media

## 2022-11-15 ENCOUNTER — TELEPHONE (OUTPATIENT)
Dept: NEUROLOGY | Age: 27
End: 2022-11-15

## 2022-11-15 NOTE — TELEPHONE ENCOUNTER
----- Message from Eduard Severe, MD sent at 11/10/2022  2:26 PM EST -----  Schedule follow up to discuss results.     EF

## 2022-11-18 ENCOUNTER — OFFICE VISIT (OUTPATIENT)
Dept: NEUROLOGY | Age: 27
End: 2022-11-18
Payer: MEDICAID

## 2022-11-18 VITALS
OXYGEN SATURATION: 98 % | BODY MASS INDEX: 26.52 KG/M2 | DIASTOLIC BLOOD PRESSURE: 70 MMHG | SYSTOLIC BLOOD PRESSURE: 120 MMHG | HEIGHT: 62 IN | HEART RATE: 109 BPM

## 2022-11-18 DIAGNOSIS — M79.609 PAIN IN EXTREMITY, UNSPECIFIED EXTREMITY: Primary | ICD-10-CM

## 2022-11-18 PROCEDURE — 99214 OFFICE O/P EST MOD 30 MIN: CPT | Performed by: PSYCHIATRY & NEUROLOGY

## 2022-11-18 NOTE — Clinical Note
11/18/2022    Patient: Raquel Stubbs   YOB: 1995   Date of Visit: 11/18/2022     Alma Hunt, [address] [place] [place] [address] [address] [place] [id number]  [address]ia     Dear Alma Hunt, [address],      Thank you for referring Ms. Raquel Stubbs to [facility] for evaluation. My notes for this consultation are attached. If you have questions, please do not hesitate to call me. I look forward to following your patient along with you.       Sincerely,    Roverto Ohara MD

## 2022-11-18 NOTE — PROGRESS NOTES
Neurology Progress Note    Patient ID:  Klever Huerta  [id number]  12 y.o.  1995      Subjective:   History:  Klever Huerta is a 31 y.o. female [address] who  has a past medical history of ADHD, Arrhythmia, Non-nicotine vapor product user, Psychiatric disorder, and PTSD (post-traumatic stress disorder). who since 2011, patient started having generalized fatigue and passing out at the gym at frank year of . (+) chronic pain in the neck and lower back pain (+) pain in arms and legs, more on the L side. Patient seen [facility] for fainting spells. Cardiac work up showed tachycardia Was seeing a neurologist at [address] was placed on Gabapentin. EMG/NCS 8 yrs ago was okay. Tried Cymbalta and Lyrica with no benefit. Accupunture. Saw a rheumatologist. MRI Brain and whole spine and EEG were unremarkable. Patient remains the same. ANS testing (11/10/22): Relatively preserved autonomic function for this age, despite reported lightheadedness during tilt table testing. ROS:  Per HPI-  Otherwise the remainder of ROS was negative    Social Hx  Social History     Socioeconomic History    Marital status: SINGLE   Tobacco Use    Smoking status: Former    Smokeless tobacco: Current    Tobacco comments:     not regural smoker   Substance and Sexual Activity    Alcohol use: Yes     Alcohol/week: 2.0 standard drinks     Types: 2 Shots of liquor per week    Drug use: Yes     Types: Marijuana     Comment: 2 weeks ago    Sexual activity: Yes     Birth control/protection: Pill       Meds:  Current Outpatient Medications on File Prior to Visit   Medication Sig Dispense Refill    busPIRone (BUSPAR) 15 mg tablet 1 (ONE) TABLET EVERY MORNING & BEDTIME      Desvenlafaxine 100 mg Tb24 Take 100 mg by mouth once. Indications: PTSD      gabapentin (NEURONTIN) 800 mg tablet Take 800 mg by mouth two (2) times a day.       dextroamphetamine-amphetamine (ADDERALL) 30 mg tablet take 1 tablet by mouth twice a day  0     No current facility-administered medications on file prior to visit. Imaging:    CT Results (recent):  Results from Hospital Encounter encounter on 05/07/21    CT SPINE LUMB WO CONT    Narrative  EXAM:  CT SPINE LUMB WO CONT    INDICATION:  Back pain    COMPARISON: None. TECHNIQUE:   Unenhanced multislice helical CT of the lumbar spine was performed  in the axial plane. Coronal and sagittal reconstructions were obtained. CT  dose reduction was achieved through use of a standardized protocol tailored for  this examination and automatic exposure control for dose modulation. FINDINGS:    Normal alignment. Vertebral body heights are preserved without evidence of an  acute fracture. Disc spaces are preserved. No significant facet arthropathy. No  significant spinal canal or neural foraminal stenosis at any level. Visualized  soft tissues are unremarkable. Impression  1. No acute abnormality. MRI Results (recent):  Results from [place] encounter on 05/07/21    MRI [facility] SPINE WO CONT    Narrative  EXAM:  MRI [facility] SPINE WO CONT    INDICATION:   Trauma and numbness    COMPARISON: CT thoracic spine 5/8/2021. TECHNIQUE: Multiplanar multisequence acquisition without contrast of the  thoracic spine. CONTRAST: None. FINDINGS:  There is normal alignment of the thoracic spine. Vertebral body heights are  maintained without evidence of acute fracture. Marrow signal is normal. Disc  spaces are preserved with no significant disc herniation, spinal canal or neural  foraminal stenosis at any level. The thoracic cord is normal in size and signal.  Visualized soft tissues are unremarkable. Impression  1. No acute or significant abnormality. IR Results (recent):  No results found for this or any previous visit. VAS/US Results (recent):  No results found for this or any previous visit.       Reviewed records in [employer] and Down tab today    Lab Review     No visits with results within 3 Month(s) from this visit. Latest known visit with results is:   Admission on 05/07/2021, Discharged on 05/08/2021   Component Date Value Ref Range Status    Ventricular Rate 05/07/2021 94  BPM Final    Atrial Rate 05/07/2021 94  BPM Final    P-R Interval 05/07/2021 122  ms Final    QRS Duration 05/07/2021 76  ms Final    Q-T Interval 05/07/2021 342  ms Final    QTC Calculation (Bezet) 05/07/2021 427  ms Final    Calculated P Axis 05/07/2021 25  degrees Final    Calculated R Axis 05/07/2021 45  degrees Final    Calculated T Axis 05/07/2021 25  degrees Final    Diagnosis 05/07/2021    Final                    Value:Normal sinus rhythm  Normal ECG  No previous ECGs available  Confirmed by Karoline Guevara MD. ([id number]) on 5/10/2021 4:54:41 PM           Objective:       Exam:  Visit Vitals  /70   Pulse (!) 109   Ht 5' 2\" (1.575 m)   SpO2 98%   BMI 26.52 kg/m²     Gen: Awake, alert, follows commands  Appropriate appearance, normal speech. Oriented to all spheres. No visual field defect on confrontation exam.  Full eyes movement, with no nystagmus, no diplopia, no ptosis. Normal gag and swallow. All remaining cranial nerves were normal  Motor function: 5/5 in all extremities  Sensory: intact to LT, PP and JPS  Good FTN and HTS   Gait: Normal    Assessment:       ICD-10-CM ICD-9-CM    1. Pain in extremity, unspecified extremity  M79.609 729.5 EMG LIMITED          Considerations include joint hympermobilty syndrome (able to touch thumb to forearm and hyperextend her fingers;  knees slightly hyperextend) and fibromyalgia (multiple tender spots, brain fog)     ANS testing unremarkable    Lightheaded spells, possible anxiety related      Plan:   Discussed ANS testing was normal with no evidence of POTS  2. Will do EMG/NCS of the L UE and L LE to evaluate nerve pain  3. Of note, patient is already seeing a psychiatrist and currently on Gabapentin and getting treated for PTSD  4.  Will consider neuropsych testing for brain fog for depending on above           Follow-up and Dispositions    Return for above testing.                Stephan Washington MD  Diplomate, American Board of Psychiatry and Neurology  Diplomate, Neuromuscular Medicine  Diplomate, American Board of Electrodiagnostic Medicine

## 2022-12-16 ENCOUNTER — OFFICE VISIT (OUTPATIENT)
Dept: NEUROLOGY | Age: 27
End: 2022-12-16
Payer: MEDICAID

## 2022-12-16 DIAGNOSIS — M79.609 PAIN IN EXTREMITY, UNSPECIFIED EXTREMITY: Primary | ICD-10-CM

## 2022-12-16 NOTE — LETTER
2022 4:24 PM    Patient:  Esthela Zarate   YOB: 1995  Date of Visit: 2022      Dear Raven Cox MD  [address] 140[address] [facility] [address]: Thank you for referring Ms. Esthela aZrate to me for EMG/NCS. EMG/ NCS Report  [facility]  - [facility]  [address] 224 [facility], [address] [place] [address] [address], [address]   Ph: [phone]   FAX: [phone]/ [phone]  Test Date:  2019      Test Date:  2022    Patient: Marisol Valdovinos : 1995 Physician: Anu Holly MD   Sex: Female Height: ' \" Ref Phys: Brooke Croft MD   ID#: [id number] Weight:  lbs. Technician: Joon Spangler     Patient History / Exam:  Patient is coming for neuropathy evaluation. Esthela Zarate is a 31 y.o. female [employer] who  has a past medical history of ADHD, Arrhythmia, Non-nicotine vapor product user, Psychiatric disorder, and PTSD (post-traumatic stress disorder). who since , patient started having generalized fatigue and passing out at the gym at [employer] year of . (+) chronic pain in the neck and lower back pain (+) pain in arms and legs, more on the L side. Patient seen [facility] cardiology - [facility] for fainting spells. Cardiac work up showed tachycardia Was seeing a neurologist at [address] was placed on Gabapentin. EMG/NCS 8 yrs ago was okay. Tried Cymbalta and Lyrica with no benefit. Accupunture. Saw a rheumatologist. MRI Brain and whole spine and EEG were unremarkable. Exam: Patient awake, alert, follows commands, clear speech; hearing grossly intact; EOMI, (-) facial asymmetry, tongue midline; Motor: 5/5 in all extremities;  Sensory: intact to LT, PP, JPS; Good FTN and HTS; Gait: stable            EMG & NCV Findings:  Evaluation of the left Fibular motor nerve showed normal distal onset latency (3.3 ms), normal amplitude (7.0 mV), normal conduction velocity (B Fib-Ankle, 51 m/s), and normal conduction velocity (Poplt-B Fib, 91 m/s). The left median motor nerve showed normal distal onset latency (3.2 ms), normal amplitude (13.8 mV), and normal conduction velocity (Elbow-Wrist, 60 m/s). The left tibial motor nerve showed normal distal onset latency (3.9 ms), normal amplitude (16.9 mV), and normal conduction velocity (Knee-Ankle, 47 m/s). The left ulnar motor nerve showed normal distal onset latency (2.1 ms), normal amplitude (12.6 mV), normal conduction velocity (B Elbow-Wrist, 62 m/s), and normal conduction velocity (A Elbow-B Elbow, 67 m/s). The left median sensory, the left radial sensory, the left Sup Fibular sensory, the left sural sensory, and the left ulnar sensory nerves showed normal distal peak latency (L2.8, L1.6, L2.2, L3.0, L2.5 ms) and normal amplitude (L76.5, L72.9, L48.8, L31.5, L49.8 µV). The left median/ulnar (palm) comparison nerve showed normal distal onset latency (Median Palm, 1.3 ms), normal distal peak latency (Median Palm, 1.7 ms), normal amplitude (Median Palm, 63.8 µV), normal distal onset latency (Ulnar Palm, 0.9 ms), normal distal peak latency (Ulnar Palm, 1.4 ms), and normal amplitude (Ulnar Palm, 14.4 µV). All F Wave latencies were within normal limits. All examined muscles (as indicated in the following table) showed no evidence of electrical instability. Impression:    Extensive electrodiagnostic examination of the left upper and left lower extremities is normal.    Specifically, there is no evidence of a peripheral neuropathy, cervical motor radiculopathy or lumbosacral motor radiculopathy.         Selena Pittman MD  Diplomate, American Board of Psychiatry and Neurology  Diplomate, Neuromuscular Medicine  Diplomate, American Board of Electrodiagnostic Medicine  Director, [address] [place] [address] Accredited Laboratory with Exemplary Status          Nerve Conduction Studies  Anti Sensory Summary Table     Stim Site NR Peak (ms) Norm Peak (ms) P-T Amp (µV) Norm P-T Amp Site1 Site2 Dist (cm) Left Median Anti Sensory (2nd Digit)  30.3 °C   Wrist    2.8 <4 76.5 >13 Wrist 2nd Digit 14.0   Left Radial Anti Sensory (Base 1st Digit)  31.4 °C   Wrist    1.6 <2.8 72.9 >11 Wrist Base 1st Digit 10.0   Left Sup Fibular Anti Sensory (Lat ankle)  31.7 °C   Lower leg    2.2 <4.4 48.8 >6 Lower leg Lat ankle 10.0   Left Sural Anti Sensory (Lat Mall)  30.9 °C   Calf    3.0 <4.5 31.5 >4.0 Calf Lat Mall 14.0   Left Ulnar Anti Sensory (5th Digit)  31.4 °C   Wrist    2.5 <4.0 49.8 >9 Wrist 5th Digit 14.0     Motor Summary Table     Stim Site NR Onset (ms) Norm Onset (ms) O-P Amp (mV) Norm O-P Amp Amp (Prev) (%) Site1 Site2 Dist (cm) Elpidio (m/s) Norm Elpidio (m/s)   Left Fibular Motor (Ext Dig Brev)  31.9 °C   Ankle    3.3 <6.5 7.0 >2.6 100.0 Ankle Ext Dig Brev 8.0     B Fib    9.0  8.2  117.1 B Fib Ankle 29.0 51 >38   Poplt    10.1  7.8  95.1 Poplt B Fib 10.0 91 >38   Left Median Motor (Abd Poll Brev)  31.4 °C   Wrist    3.2 <4.5 13.8 >4.1 100.0 Wrist Abd Poll Brev 8.0     Elbow    6.2  13.8  100.0 Elbow Wrist 18.0 60 >49   Left Tibial Motor (Abd Santacruz Brev)  32.2 °C   Ankle    3.9 <6.1 16.9 >5.8 100.0 Ankle Abd Santacruz Brev 8.0     Knee    10.5  12.2  72.2 Knee Ankle 31.0 47 >44   Left Ulnar Motor (Abd Dig Minimi)  32.1 °C   Wrist    2.1 <3.7 12.6 >7.9 100.0 Wrist Abd Dig Minimi 8.0     B Elbow    5.0  11.6  92.1 B Elbow Wrist 18.0 62 >52   A Elbow    6.5  11.4  98.3 A Elbow B Elbow 10.0 67 >43     Comparison Summary Table     Stim Site NR Peak (ms) P-T Amp (µV) Site1 Site2 Dist (cm) Delta-0 (ms)   Left Median/Ulnar Palm Comparison (Wrist)  31.5 °C   Median Palm    1.7 87.6 Median Palm Ulnar Palm 8.0 0.4   Ulnar Palm    1.4 17.2         F Wave Studies     NR F-Lat (ms) Lat Norm (ms) L-R F-Lat (ms) L-R Lat Norm   Left Tibial (Mrkrs) (Abd Hallucis)  32.1 °C      43.51 <56  <5.7   Left Ulnar (Mrkrs) (Abd Dig Min)  32.5 °C      20.00 <32  <2.5     H Reflex Studies     NR H-Lat (ms) L-R H-Lat (ms) L-R Lat Norm   Left Tibial (Gastroc) 32.3 °C      28.19  <2.0     EMG     Side Muscle Nerve Root Ins Act Fibs Psw Recrt Duration Amp Poly Comment   Left 1stDorInt Ulnar C8-T1 Nml Nml Nml Nml Nml Nml Nml    Left Ext Dig Brev Dp Br Peron L5, S1 Nml Nml Nml Nml Nml Nml Nml    Left AbdHallucis MedPlantar S1-2 Nml Nml Nml Nml Nml Nml Nml    Left AntTibialis Dp Br Peron L4-5 Nml Nml Nml Nml Nml Nml Nml    Left MedGastroc Tibial S1-2 Nml Nml Nml Nml Nml Nml Nml    Left VastusLat Femoral L2-4 Nml Nml Nml Nml Nml Nml Nml    Left GluteusMed SupGluteal L4-S1 Nml Nml Nml Nml Nml Nml Nml    Left Lower Lumb Parasp Rami L5,S1 Nml Nml Nml Nml Nml Nml Nml    Left ExtIndicis Radial (Post Int) C7-8 Nml Nml Nml Nml Nml Nml Nml    Left Abd Poll Brev Median C8-T1 Nml Nml Nml Nml Nml Nml Nml    Left Biceps Musculocut C5-6 Nml Nml Nml Nml Nml Nml Nml    Left Triceps Radial C6-7-8 Nml Nml Nml Nml Nml Nml Nml    Left Deltoid Axillary C5-6 Nml Nml Nml Nml Nml Nml Nml    Left Lower Cerv Parasp Rami C7,T1 Nml Nml Nml Nml Nml Nml Nml                Nerve Conduction Studies  Anti Sensory Left/Right Comparison     Stim Site L Lat (ms) R Lat (ms) L-R Lat (ms) L Amp (µV) R Amp (µV) L-R Amp (%) Site1 Site2 L Elpidio (m/s) R Elpidio (m/s) L-R Elpidio (m/s)   Median Anti Sensory (2nd Digit)  30.3 °C   Wrist 2.2   76.5   Wrist 2nd Digit 64     Radial Anti Sensory (Base 1st Digit)  31.4 °C   Wrist 1.0   72.9   Wrist Base 1st Digit 100     Sup Fibular Anti Sensory (Lat ankle)  31.7 °C   Lower leg 1.6   48.8   Lower leg Lat ankle 63     Sural Anti Sensory (Lat Mall)  30.9 °C   Calf 2.5   31.5   Calf Lat Mall 56     Ulnar Anti Sensory (5th Digit)  31.4 °C   Wrist 1.8   49.8   Wrist 5th Digit 78       Motor Left/Right Comparison     Stim Site L Lat (ms) R Lat (ms) L-R Lat (ms) L Amp (mV) R Amp (mV) L-R Amp (%) Site1 Site2 L Elpidio (m/s) R Elpidio (m/s) L-R Elpidio (m/s)   Fibular Motor (Ext Dig Brev)  31.9 °C   Ankle 3.3   7.0   Ankle Ext Dig Brev      B Fib 9.0   8.2   B Fib Ankle 51     Poplt 10.1   7.8   Poplt B Fib 91     Median Motor (Abd Poll Brev)  31.4 °C   Wrist 3.2   13.8   Wrist Abd Poll Brev      Elbow 6.2   13.8   Elbow Wrist 60     Tibial Motor (Abd Santacruz Brev)  32.2 °C   Ankle 3.9   16.9   Ankle Abd Santacruz Brev      Knee 10.5   12.2   Knee Ankle 47     Ulnar Motor (Abd Dig Minimi)  32.1 °C   Wrist 2.1   12.6   Wrist Abd Dig Minimi      B Elbow 5.0   11.6   B Elbow Wrist 62     A Elbow 6.5   11.4   A Elbow B Elbow 67       Comparison Left/Right Comparison     Stim Site L Lat (ms) R Lat (ms) L-R Lat (ms) L Amp (µV) R Amp (µV) L-R Amp (%)   Median/Ulnar Palm Comparison (Wrist)  31.5 °C   Median Palm 1.3   63.8     Ulnar Palm 0.9   14.4           Waveforms:

## 2023-05-19 RX ORDER — BUSPIRONE HYDROCHLORIDE 15 MG/1
TABLET ORAL
COMMUNITY
Start: 2022-08-12

## 2023-05-19 RX ORDER — GABAPENTIN 800 MG/1
800 TABLET ORAL 2 TIMES DAILY
COMMUNITY

## 2023-05-19 RX ORDER — DEXTROAMPHETAMINE SACCHARATE, AMPHETAMINE ASPARTATE, DEXTROAMPHETAMINE SULFATE AND AMPHETAMINE SULFATE 7.5; 7.5; 7.5; 7.5 MG/1; MG/1; MG/1; MG/1
1 TABLET ORAL 2 TIMES DAILY
COMMUNITY
Start: 2018-12-06

## 2023-05-19 RX ORDER — DESVENLAFAXINE 100 MG/1
100 TABLET, EXTENDED RELEASE ORAL ONCE
COMMUNITY

## 2023-07-26 NOTE — PROCEDURES
A1C:   Lab Results   Component Value Date/Time    LABA1C 5.8 05/15/2023 02:03 PM    LABA1C 6.0 04/14/2023 09:53 AM    LABA1C 6.2 09/15/2022 09:39 AM    LABA1C 6.3 09/21/2020 03:15 PM    LABA1C 5.8 12/11/2019 01:08 PM     Stable- continue home blood sugar monitoring  Counselled on Diet and exercise with goal to achieve appropriate BMI  Continue Diabetic medication as documented in medication list Metformin 500 mg bid   Continue home foot care  Patient agreed with plan with verbal understanding EMG/ NCS Report  [facility]  [address] [facility], [address] [place] [address] [address], [address]   Ph: [phone]   FAX: [phone]  Test Date:  2019      Test Date:  2022    Patient: Jose Antonio Machado : 1995 Physician: Timo Marrero MD   Sex: Female Height: ' \" Ref Phys: Ember Mendoza MD   ID#: [id number] Weight:  lbs. Technician: Uday Encinas     Patient History / Exam:  Patient is coming for neuropathy evaluation. Sissy Tena is a 31 y.o. female [employer] who  has a past medical history of ADHD, Arrhythmia, Non-nicotine vapor product user, Psychiatric disorder, and PTSD (post-traumatic stress disorder). who since , patient started having generalized fatigue and passing out at the gym at [employer] year of . (+) chronic pain in the neck and lower back pain (+) pain in arms and legs, more on the L side. Patient seen [facility] cardiology - [facility] for fainting spells. Cardiac work up showed tachycardia Was seeing a neurologist at [facility] was placed on Gabapentin. EMG/NCS 8 yrs ago was okay. Tried Cymbalta and Lyrica with no benefit. Accupunture. Saw a rheumatologist. MRI Brain and whole spine and EEG were unremarkable. Exam: Patient awake, alert, follows commands, clear speech; hearing grossly intact; EOMI, (-) facial asymmetry, tongue midline; Motor: 5/5 in all extremities; Sensory: intact to LT, PP, JPS; Good FTN and HTS; Gait: stable            EMG & NCV Findings:  Evaluation of the left Fibular motor nerve showed normal distal onset latency (3.3 ms), normal amplitude (7.0 mV), normal conduction velocity (B Fib-Ankle, 51 m/s), and normal conduction velocity (Poplt-B Fib, 91 m/s). The left median motor nerve showed normal distal onset latency (3.2 ms), normal amplitude (13.8 mV), and normal conduction velocity (Elbow-Wrist, 60 m/s).   The left tibial motor nerve showed normal distal onset latency (3.9 ms), normal amplitude (16.9 mV), and normal conduction velocity (Knee-Ankle, 47 m/s). The left ulnar motor nerve showed normal distal onset latency (2.1 ms), normal amplitude (12.6 mV), normal conduction velocity (B Elbow-Wrist, 62 m/s), and normal conduction velocity (A Elbow-B Elbow, 67 m/s). The left median sensory, the left radial sensory, the left Sup Fibular sensory, the left sural sensory, and the left ulnar sensory nerves showed normal distal peak latency (L2.8, L1.6, L2.2, L3.0, L2.5 ms) and normal amplitude (L76.5, L72.9, L48.8, L31.5, L49.8 µV). The left median/ulnar (palm) comparison nerve showed normal distal onset latency (Median Palm, 1.3 ms), normal distal peak latency (Median Palm, 1.7 ms), normal amplitude (Median Palm, 63.8 µV), normal distal onset latency (Ulnar Palm, 0.9 ms), normal distal peak latency (Ulnar Palm, 1.4 ms), and normal amplitude (Ulnar Palm, 14.4 µV). All F Wave latencies were within normal limits. All examined muscles (as indicated in the following table) showed no evidence of electrical instability. Impression:    Extensive electrodiagnostic examination of the left upper and left lower extremities is normal.    Specifically, there is no evidence of a peripheral neuropathy, cervical motor radiculopathy or lumbosacral motor radiculopathy.         Naila Prabhakar MD  Diplomate, American Board of Psychiatry and Neurology  Diplomate, Neuromuscular Medicine  Diplomate, American Board of Electrodiagnostic Medicine  Director, [address] [place] [address] Accredited Laboratory with Exemplary Status          Nerve Conduction Studies  Anti Sensory Summary Table     Stim Site NR Peak (ms) Norm Peak (ms) P-T Amp (µV) Norm P-T Amp Site1 Site2 Dist (cm)   Left Median Anti Sensory (2nd Digit)  30.3 °C   Wrist    2.8 <4 76.5 >13 Wrist 2nd Digit 14.0   Left Radial Anti Sensory (Base 1st Digit)  31.4 °C   Wrist    1.6 <2.8 72.9 >11 Wrist Base 1st Digit 10.0   Left Sup Fibular Anti Sensory (Lat ankle)  31.7 °C Lower leg    2.2 <4.4 48.8 >6 Lower leg Lat ankle 10.0   Left Sural Anti Sensory (Lat Mall)  30.9 °C   Calf    3.0 <4.5 31.5 >4.0 Calf Lat Mall 14.0   Left Ulnar Anti Sensory (5th Digit)  31.4 °C   Wrist    2.5 <4.0 49.8 >9 Wrist 5th Digit 14.0     Motor Summary Table     Stim Site NR Onset (ms) Norm Onset (ms) O-P Amp (mV) Norm O-P Amp Amp (Prev) (%) Site1 Site2 Dist (cm) Elpidio (m/s) Norm Elpidio (m/s)   Left Fibular Motor (Ext Dig Brev)  31.9 °C   Ankle    3.3 <6.5 7.0 >2.6 100.0 Ankle Ext Dig Brev 8.0     B Fib    9.0  8.2  117.1 B Fib Ankle 29.0 51 >38   Poplt    10.1  7.8  95.1 Poplt B Fib 10.0 91 >38   Left Median Motor (Abd Poll Brev)  31.4 °C   Wrist    3.2 <4.5 13.8 >4.1 100.0 Wrist Abd Poll Brev 8.0     Elbow    6.2  13.8  100.0 Elbow Wrist 18.0 60 >49   Left Tibial Motor (Abd Santacruz Brev)  32.2 °C   Ankle    3.9 <6.1 16.9 >5.8 100.0 Ankle Abd Santacruz Brev 8.0     Knee    10.5  12.2  72.2 Knee Ankle 31.0 47 >44   Left Ulnar Motor (Abd Dig Minimi)  32.1 °C   Wrist    2.1 <3.7 12.6 >7.9 100.0 Wrist Abd Dig Minimi 8.0     B Elbow    5.0  11.6  92.1 B Elbow Wrist 18.0 62 >52   A Elbow    6.5  11.4  98.3 A Elbow B Elbow 10.0 67 >43     Comparison Summary Table     Stim Site NR Peak (ms) P-T Amp (µV) Site1 Site2 Dist (cm) Delta-0 (ms)   Left Median/Ulnar Palm Comparison (Wrist)  31.5 °C   Median Palm    1.7 87.6 Median Palm Ulnar Palm 8.0 0.4   Ulnar Palm    1.4 17.2         F Wave Studies     NR F-Lat (ms) Lat Norm (ms) L-R F-Lat (ms) L-R Lat Norm   Left Tibial (Mrkrs) (Abd Hallucis)  32.1 °C      43.51 <56  <5.7   Left Ulnar (Mrkrs) (Abd Dig Min)  32.5 °C      20.00 <32  <2.5     H Reflex Studies     NR H-Lat (ms) L-R H-Lat (ms) L-R Lat Norm   Left Tibial (Gastroc)  32.3 °C      28.19  <2.0     EMG     Side Muscle Nerve Root Ins Act Fibs Psw Recrt Duration Amp Poly Comment   Left 1stDorInt Ulnar C8-T1 Nml Nml Nml Nml Nml Nml Nml    Left Ext Dig Brev Dp Br Peron L5, S1 Nml Nml Nml Nml Nml Nml Nml    Left AbdHallucis MedPlantar S1-2 Nml Nml Nml Nml Nml Nml Nml    Left AntTibialis Dp Br Peron L4-5 Nml Nml Nml Nml Nml Nml Nml    Left MedGastroc Tibial S1-2 Nml Nml Nml Nml Nml Nml Nml    Left VastusLat Femoral L2-4 Nml Nml Nml Nml Nml Nml Nml    Left GluteusMed SupGluteal L4-S1 Nml Nml Nml Nml Nml Nml Nml    Left Lower Lumb Parasp Rami L5,S1 Nml Nml Nml Nml Nml Nml Nml    Left ExtIndicis Radial (Post Int) C7-8 Nml Nml Nml Nml Nml Nml Nml    Left Abd Poll Brev Median C8-T1 Nml Nml Nml Nml Nml Nml Nml    Left Biceps Musculocut C5-6 Nml Nml Nml Nml Nml Nml Nml    Left Triceps Radial C6-7-8 Nml Nml Nml Nml Nml Nml Nml    Left Deltoid Axillary C5-6 Nml Nml Nml Nml Nml Nml Nml    Left Lower Cerv Parasp Rami C7,T1 Nml Nml Nml Nml Nml Nml Nml                Nerve Conduction Studies  Anti Sensory Left/Right Comparison     Stim Site L Lat (ms) R Lat (ms) L-R Lat (ms) L Amp (µV) R Amp (µV) L-R Amp (%) Site1 Site2 L Elpidio (m/s) R Elpidio (m/s) L-R Elpidio (m/s)   Median Anti Sensory (2nd Digit)  30.3 °C   Wrist 2.2   76.5   Wrist 2nd Digit 64     Radial Anti Sensory (Base 1st Digit)  31.4 °C   Wrist 1.0   72.9   Wrist Base 1st Digit 100     Sup Fibular Anti Sensory (Lat ankle)  31.7 °C   Lower leg 1.6   48.8   Lower leg Lat ankle 63     Sural Anti Sensory (Lat Mall)  30.9 °C   Calf 2.5   31.5   Calf Lat Mall 56     Ulnar Anti Sensory (5th Digit)  31.4 °C   Wrist 1.8   49.8   Wrist 5th Digit 78       Motor Left/Right Comparison     Stim Site L Lat (ms) R Lat (ms) L-R Lat (ms) L Amp (mV) R Amp (mV) L-R Amp (%) Site1 Site2 L Elpidio (m/s) R Elpidio (m/s) L-R Elpidio (m/s)   Fibular Motor (Ext Dig Brev)  31.9 °C   Ankle 3.3   7.0   Ankle Ext Dig Brev      B Fib 9.0   8.2   B Fib Ankle 51     Poplt 10.1   7.8   Poplt B Fib 91     Median Motor (Abd Poll Brev)  31.4 °C   Wrist 3.2   13.8   Wrist Abd Poll Brev      Elbow 6.2   13.8   Elbow Wrist 60     Tibial Motor (Abd Santacruz Brev)  32.2 °C   Ankle 3.9   16.9   Ankle Abd Santacruz Brev      Knee 10.5   12.2   Knee Ankle 47 Ulnar Motor (Abd Dig Minimi)  32.1 °C   Wrist 2.1   12.6   Wrist Abd Dig Minimi      B Elbow 5.0   11.6   B Elbow Wrist 62     A Elbow 6.5   11.4   A Elbow B Elbow 67       Comparison Left/Right Comparison     Stim Site L Lat (ms) R Lat (ms) L-R Lat (ms) L Amp (µV) R Amp (µV) L-R Amp (%)   Median/Ulnar Palm Comparison (Wrist)  31.5 °C   Median Palm 1.3   63.8     Ulnar Palm 0.9   14.4           Waveforms:

## 2025-08-08 ENCOUNTER — TRANSCRIBE ORDERS (OUTPATIENT)
Facility: HOSPITAL | Age: 30
End: 2025-08-08

## 2025-08-08 DIAGNOSIS — M79.18 MUSCULOSKELETAL PAIN: ICD-10-CM

## 2025-08-08 DIAGNOSIS — M79.18 MYOFASCIAL PAIN: Primary | ICD-10-CM

## (undated) DEVICE — COVER,MAYO STAND,STERILE: Brand: MEDLINE

## (undated) DEVICE — AIRSEAL BIFURCATED SMOKE EVAC FILTERED TUBE SET: Brand: AIRSEAL

## (undated) DEVICE — BNDG ADH FABRIC 2X4IN ST LF --

## (undated) DEVICE — TIP COVER ACCESSORY

## (undated) DEVICE — STRAP,POSITIONING,KNEE/BODY,FOAM,4X60": Brand: MEDLINE

## (undated) DEVICE — TOWEL SURG W17XL27IN STD BLU COT NONFENESTRATED PREWASHED

## (undated) DEVICE — VISUALIZATION SYSTEM: Brand: CLEARIFY

## (undated) DEVICE — 3M™ IOBAN™ 2 ANTIMICROBIAL INCISE DRAPE 6648EZ: Brand: IOBAN™ 2

## (undated) DEVICE — REM POLYHESIVE ADULT PATIENT RETURN ELECTRODE: Brand: VALLEYLAB

## (undated) DEVICE — MASTISOL ADHESIVE LIQ 2/3ML

## (undated) DEVICE — SURGICAL PROCEDURE KIT GEN LAPAROSCOPY LF

## (undated) DEVICE — CARTRIDGE CLP LIG HEMLOK GRN --

## (undated) DEVICE — SOL IRRIGATION INJ NACL 0.9% 500ML BTL

## (undated) DEVICE — ELECTRO LUBE IS A SINGLE PATIENT USE DEVICE THAT IS INTENDED TO BE USED ON ELECTROSURGICAL ELECTRODES TO REDUCE STICKING.: Brand: KEY SURGICAL ELECTRO LUBE

## (undated) DEVICE — Device

## (undated) DEVICE — STERILE POLYISOPRENE POWDER-FREE SURGICAL GLOVES: Brand: PROTEXIS

## (undated) DEVICE — SEAL UNIV 5-8MM DISP BX/10 -- DA VINCI XI - SNGL USE

## (undated) DEVICE — DRAPE,REIN 53X77,STERILE: Brand: MEDLINE

## (undated) DEVICE — LAPAROSCOPIC TROCAR SLEEVE/SINGLE USE: Brand: KII® OPTICAL ACCESS SYSTEM

## (undated) DEVICE — INFECTION CONTROL KIT SYS

## (undated) DEVICE — STRIP,CLOSURE,WOUND,MEDI-STRIP,1/2X4: Brand: MEDLINE

## (undated) DEVICE — BLADELESS OBTURATOR: Brand: WECK VISTA

## (undated) DEVICE — BAG SPEC REM 224ML W4XL6IN DIA10MM 1 HND GYN DISP ENDOPCH

## (undated) DEVICE — SUTURE EASE CROSSBOW CLSR SYS

## (undated) DEVICE — ARM DRAPE

## (undated) DEVICE — DEVICE TRNSF SPIK STL 2008S] MICROTEK MEDICAL INC]

## (undated) DEVICE — CANISTER, RIGID, 3000CC: Brand: MEDLINE INDUSTRIES, INC.

## (undated) DEVICE — (D)PREP SKN CHLRAPRP APPL 26ML -- CONVERT TO ITEM 371833

## (undated) DEVICE — NEEDLE HYPO 22GA L1.5IN BLK S STL HUB POLYPR SHLD REG BVL

## (undated) DEVICE — TROCAR: Brand: KII® OPTICAL ACCESS SYSTEM

## (undated) DEVICE — SUTURE SZ 0 27IN 5/8 CIR UR-6  TAPER PT VIOLET ABSRB VICRYL J603H

## (undated) DEVICE — COVER LT HNDL PLAS RIG 1 PER PK

## (undated) DEVICE — SUTURE MCRYL SZ 4-0 L27IN ABSRB UD L19MM PS-2 1/2 CIR PRIM Y426H